# Patient Record
Sex: MALE | Race: AMERICAN INDIAN OR ALASKA NATIVE | NOT HISPANIC OR LATINO | URBAN - METROPOLITAN AREA
[De-identification: names, ages, dates, MRNs, and addresses within clinical notes are randomized per-mention and may not be internally consistent; named-entity substitution may affect disease eponyms.]

---

## 2017-02-15 ENCOUNTER — APPOINTMENT (OUTPATIENT)
Dept: TRANSPLANT | Facility: CLINIC | Age: 67
End: 2017-02-15
Attending: NURSE PRACTITIONER
Payer: MEDICARE

## 2017-02-15 ENCOUNTER — TELEPHONE (OUTPATIENT)
Dept: TRANSPLANT | Facility: CLINIC | Age: 67
End: 2017-02-15

## 2017-02-15 VITALS — WEIGHT: 211 LBS | HEIGHT: 72 IN | BODY MASS INDEX: 28.58 KG/M2

## 2017-02-15 NOTE — TELEPHONE ENCOUNTER
SOT LUNG INTAKE    February 15, 2017    Yovany Carr  [id number]  Body mass index is 28.62 kg/(m^2).  Referring Provider: Dr. Oli Sellers (Pulmonary, [facility] ), Dr. Justino Best (Surgery, [facility]), Charlette Carr NP ([facility])  Diagnosis: ILD, IPF, DM, CKD  Source/Facility: [place], [place]    History:  Smoking/nicotine use history: wife stated her  smoked cigarettes 1pk/ day, started at age 17, quit 17 yrs ago  Alcohol use history: stated used to drink beer on the weekends, at age 18 and quit age 23  Drug use history: none  Cancer history: entered in Epic  Cardiac history: entered in Epic    Family History and Social History were completed.    Primary Care  Colonoscopy: [place] ?unsure of date  Dental: has an upper plate and has an appt to have a lower plate placed  Hepatitis A/B: stated was given at the [facility] in 2013  Pneumovax: entered in Epic    Pulmonary Tests   PFTs: [facility], ?date  6 Minute Walk: [facility], ?date  Sleep Study: hx of SHASHANK 2013    Diagnostic Tests/Imaging  Heart cath: none  Stress Test: unsure if done, and if it was, would have been done at [facility] as stated  ECHO: none    Chest CT: unsure  DEXA: none  Upper GI: [facility]  5/3/16     Notes: Pt's wife gave the hx, Yovany was on the background and she was verifying information with him when not sure of diagnostic tests and dates.  All other med/ surg rec are pending.

## 2017-02-15 NOTE — TELEPHONE ENCOUNTER
Intake Progress Note    Organ: Lung    Referral Origin: fax    Referring Physician: Dr. Oli Sellers    Assigned Coordinator: Paty Steve    Reported Diagnosis: PF    Reported Medical History: [facility] in [place] and [facility] in [place], [place]     Records: Request through [employer]     Education Material: Mailed lung packet    Best time patient can be reached: Anytime    MyChart Status: Pending     Informed patient to call if packet hasn t been received within 10 business days  Insurance:   Medicare Part A     Health Service    Plan: [employer]  Group: [employer]

## 2017-02-15 NOTE — Clinical Note
Paty, it was [place] wife I s/w and she was consulting pt during the whole intake. The med/ surg hx is updated in [facility] and other med rec pending.

## 2017-02-15 NOTE — LETTER
February 15, 2017            Yovany Carr  [address]  [facility] [address]      Dear Dr. Sellers,    We are writing to inform you that Yovany Carr has completed the referral process with us to be evaluated for a lung transplant.    Their assigned Transplant Coordinator is Paty Steve. If you should have any questions or concerns, please don t hesitate to contact us.        Regards,           Solid Organ Transplant Intake   [facility], [address]  [address], [facility] [address]  Phone: [phone]  [place] [address]

## 2017-02-17 ENCOUNTER — MEDICAL CORRESPONDENCE (OUTPATIENT)
Dept: TRANSPLANT | Facility: CLINIC | Age: 67
End: 2017-02-17

## 2017-02-20 ENCOUNTER — MEDICAL CORRESPONDENCE (OUTPATIENT)
Dept: TRANSPLANT | Facility: CLINIC | Age: 67
End: 2017-02-20

## 2017-05-09 NOTE — TELEPHONE ENCOUNTER
"SOT LUNG INTAKE    May 9, 2017    Yovany Carr  [id number]  Referring Provider: Charlette Carr NP  Source/Facility: records/patient     Diagnosis: ILD, cirrhosis  67 year old    Height: 6' 0\"  Weight: 200 lbs  BMI:  27.1    Social History:    Social History     Social History     Marital status:      Spouse name: N/A     Number of children: N/A     Years of education: N/A     Occupational History     Not on file.     Social History Main Topics     Smoking status: Former Smoker     Packs/day: 1.00     Years: 32.00     Types: Cigarettes     Quit date: 1/1/2000     Smokeless tobacco: Not on file     Alcohol use Yes      Comment: occ on weekends from age 18- 23     Drug use: No     Sexual activity: Not on file     Other Topics Concern     Not on file     Social History Narrative       Second-hand Smoke exposure:      Family History:    Family History   Problem Relation Age of Onset     Uterine Cancer Mother      Colon Cancer Mother      Breast Cancer Father      Breast Cancer Sister      Uterine Cancer Sister        Past Medical History  Pulmonary Manifestations date: patient first aware after first of year   Details: Dr. Sellers prescribed pirfenidone, not tolerated due to abdominal discomfort and diarrhea (c-diff at the time)    Diabetes: yes, on insulin  Coronary Artery Disease: none known  Hypertension: no   Previous transfusion(s):       Other Past Medical History:      Past Medical History:   Diagnosis Date     CHF (congestive heart failure) (H)      Cirrhosis (H) 06/13/2013     DM (diabetes mellitus screen) 01/11/2013     Esophageal varices (H) 09/09/2013     GERD (gastroesophageal reflux disease)      IPF (idiopathic pulmonary fibrosis) (H) 05/30/2013     Mediastinal adenopathy 08/17/2012     NAFLD (nonalcoholic fatty liver disease)        Surgical History   Lung Biopsy: no  Pneumothorax:    Chest Surgery:      Past Surgical History:   Procedure Laterality Date     ARTHROSCOPY KNEE Left 1998     CYSTOSCOPY "  10/22/2013    with stent placement     OTHER SURGICAL HISTORY      Laminectomy      OTHER SURGICAL HISTORY      Extracorpeal shock wave lithotripsy     OTHER SURGICAL HISTORY  2012    Mediastinoscopy       Mental Health History  Depression: not discussed  Anxiety:    Other:      Medications  No current outpatient prescriptions on file.     No current facility-administered medications for this visit.      Blood thinner hx:    Prednisone hx:   Antibiotic hx: on xifaxin for prophylaxis for hepatic encephalopathy  Narcotic hx:    Lactulose: was on for about a year, off for 2 months due to diarrhea    Allergies  Review of patient's allergies indicates not on file.      Pulmonary Tests and Status  PFT's  Date: 17:  FVC  2.98, 62%   FEV1  2.27, 63%  DLCO 9.7, 39%  T.43, 61%   RV 1.35, 53%        6 Minute Walk: not found    Oxygen use (concern about compliance with use per NP note 2017)   At rest: 3 1/2 -4 liters   Sleep: 4 liters   With Activity: 5 liters (machine only goes to 5 liters)   Date of O2 initiation:        Sleep Study: yes, [place] per notes, negative for SHASHANK per notes    BiPAP/CPAP: no    Pulmonary Rehab: enrolled, 2x/week   Date: 2017 Location:        Current activity:   Walking is OK, stairs are more difficult.  Still showers without oxygen.  Does not do any shopping  No yard work this year.         Hospitalizations in prior 12 months  Date:  11/10-15/16  Location:  [facility] Reason: worsening dyspnea, fluid retention in abdomen and legs, pancytopenia (WBC 3.3, hgb 8.5, plt 60), creat 1.33, ammonia 71, ascites  Date:  - Location: [facility]  Reason: dyspnea         Diagnostic Tests/Imaging  Heart cath: no  Stress Test: 13: Negative-normal-adequate dobutamine stress echo with no  evidence of ischemia.    ECHO: 16: Normal left ventricular systolic function.  The ejection fraction is visually estimated to be 60 %.  There are no left  ventricular regional wall motion abnormalities.  There is moderate mitral stenosis  (11/04/16: mild mitral regurg)  Normal right ventricular size. Normal right ventricular systolic function. Normal right ventricular wall thickness.    CXR: 11/23/16: FINDINGS:  Comparison with 11/14/2016. Again noted is some elevation of the right  hemidiaphragm. There are diffuse increase in markings throughout the lungs  as seen on the earlier study. I did not see evidence of a new focal  infiltrate.    Chest CT: 11/10/16: IMPRESSION:  Relatively unchanged appearance of the paraseptal emphysematous changes  with apparent region of what appears to be almost honeycombing in the  apical segment of the right lower lobe though unchanged dating back to  2013. There is new interstitial thickening and centrilobular thickening  with somewhat nodular air space densities in the lower lobes, right  greater than left. This is favored to represent an underlying  bronchiolitis which may be infections or inflammatory in etiology. An  element of interstitial edema is likely present. Trace left pleural  effusion.  Cirrhotic liver morphology, portal hypertension, splenomegaly and  portosystemic shunt vasculature which is incompletely assessed and better  detailed on CT abdomen dated 06/13/2016.  Perihepatic ascites is noted and new.        DEXA: not found    Endoscopy: 5/03/16: Grade 2-3 varices were found in the mid-esophagus, band  ligation x 4 was performed. Mucosal scars were noted in distal esophagus  from prior band ligation. 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 Mild hypertensive  portal gastropathy was found in the body of the stomach, cardia, and  fundus. Normal cardia and fundus. On retroflexed view, there was no  evidence of varices in the stomach. Normal 1st portion of the duodenum,  2nd portion of the duodenum, and 3rd portion of the duodenum.    Paracentesis: 11/11/16:  IMPRESSION: Ultrasound-guided paracentesis yielding 1750 mL of serous,  yellow fluid.     Upper GI: 8/20/14: gastric antrum path: reactive/chemical gastropathy    Mediastinoscopy: 8/28/12: biopsy for mediastinal adenopathy, also noted vocal cord paralysis (path not attached)    LE venous dopplers: 11/11/16  No LE DVT (elevated D-dimer)    Primary Care  Health Maintenance   Topic Date Due     ADVANCE DIRECTIVE PLANNING Q5 YRS (NO INBASKET)  02/23/1970     HEPATITIS C SCREENING  02/23/1970     LIPID SCREEN Q5 YR MALE (SYSTEM ASSIGNED)  02/23/1987     COLON CANCER SCREEN (SYSTEM ASSIGNED)  02/23/2002     FALL RISK ASSESSMENT  02/23/2017     PNEUMOCOCCAL (1 of 2 - PCV13) 02/23/2017     AORTIC ANEURYSM SCREENING (SYSTEM ASSIGNED)  02/23/2017     INFLUENZA VACCINE (SYSTEM ASSIGNED)  09/01/2017     TETANUS IMMUNIZATION (SYSTEM ASSIGNED)  02/24/2019     PSA: normal in 2013  Colonoscopy: intermittent rectal bleeding 11/2016--advised to flu with PCP,   11/4/15: colonoscopy One 6 mm polyp at 50 cm proximal to the anus. Resected and retrieved.  - One 4 mm polyp at 40 cm proximal to the anus. Resected and retrieved.  - The examination was otherwise normal.    Labs  See Care Everywhere       Financial Concerns: home PT/OT in November 2016 post hospitalization        Notes: discussed with patient that per discussion with lung transplant team members ([employer] meeting), that due to concerns of history of cirrhosis, esophageal varices, etc, lung transplant is not something that can be offered at this time.  Offered clinic visit for ILD assessment/second opinion.      Plan: clinic visit with full PFTs and 6mw.    Concerns: cirrhosis, CKD

## 2017-05-18 DIAGNOSIS — J84.9 ILD (INTERSTITIAL LUNG DISEASE) (H): Primary | ICD-10-CM

## 2017-06-22 ENCOUNTER — OFFICE VISIT (OUTPATIENT)
Dept: PULMONOLOGY | Facility: CLINIC | Age: 67
End: 2017-06-22
Attending: INTERNAL MEDICINE
Payer: MEDICARE

## 2017-06-22 ENCOUNTER — APPOINTMENT (OUTPATIENT)
Dept: ULTRASOUND IMAGING | Facility: CLINIC | Age: 67
End: 2017-06-22
Attending: EMERGENCY MEDICINE
Payer: MEDICARE

## 2017-06-22 ENCOUNTER — HOSPITAL ENCOUNTER (EMERGENCY)
Facility: CLINIC | Age: 67
Discharge: HOME | End: 2017-06-22
Attending: EMERGENCY MEDICINE | Admitting: EMERGENCY MEDICINE
Payer: MEDICARE

## 2017-06-22 ENCOUNTER — APPOINTMENT (OUTPATIENT)
Dept: GENERAL RADIOLOGY | Facility: CLINIC | Age: 67
End: 2017-06-22
Attending: EMERGENCY MEDICINE
Payer: MEDICARE

## 2017-06-22 VITALS
SYSTOLIC BLOOD PRESSURE: 149 MMHG | OXYGEN SATURATION: 90 % | RESPIRATION RATE: 20 BRPM | DIASTOLIC BLOOD PRESSURE: 68 MMHG | HEART RATE: 80 BPM

## 2017-06-22 VITALS
OXYGEN SATURATION: 94 % | TEMPERATURE: 98 F | DIASTOLIC BLOOD PRESSURE: 58 MMHG | HEART RATE: 65 BPM | WEIGHT: 205.6 LBS | SYSTOLIC BLOOD PRESSURE: 150 MMHG | RESPIRATION RATE: 16 BRPM | HEIGHT: 72 IN | BODY MASS INDEX: 27.85 KG/M2

## 2017-06-22 DIAGNOSIS — J84.9 ILD (INTERSTITIAL LUNG DISEASE) (H): Primary | ICD-10-CM

## 2017-06-22 DIAGNOSIS — J84.9 ILD (INTERSTITIAL LUNG DISEASE) (H): ICD-10-CM

## 2017-06-22 LAB
ALBUMIN SERPL-MCNC: 2.4 G/DL (ref 3.4–5)
ALP SERPL-CCNC: 213 U/L (ref 40–150)
ALT SERPL W P-5'-P-CCNC: 43 U/L (ref 0–70)
ANION GAP SERPL CALCULATED.3IONS-SCNC: 5 MMOL/L (ref 3–14)
AST SERPL W P-5'-P-CCNC: 51 U/L (ref 0–45)
BASOPHILS # BLD AUTO: 0 10E9/L (ref 0–0.2)
BASOPHILS NFR BLD AUTO: 0.6 %
BILIRUB DIRECT SERPL-MCNC: 0.4 MG/DL (ref 0–0.2)
BILIRUB SERPL-MCNC: 1.4 MG/DL (ref 0.2–1.3)
BUN SERPL-MCNC: 20 MG/DL (ref 7–30)
CALCIUM SERPL-MCNC: 8.7 MG/DL (ref 8.5–10.1)
CHLORIDE SERPL-SCNC: 110 MMOL/L (ref 94–109)
CO2 BLDCOV-SCNC: 25 MMOL/L (ref 21–28)
CO2 SERPL-SCNC: 27 MMOL/L (ref 20–32)
CREAT SERPL-MCNC: 1.15 MG/DL (ref 0.66–1.25)
DIFFERENTIAL METHOD BLD: ABNORMAL
EOSINOPHIL # BLD AUTO: 0.1 10E9/L (ref 0–0.7)
EOSINOPHIL NFR BLD AUTO: 3.2 %
ERYTHROCYTE [DISTWIDTH] IN BLOOD BY AUTOMATED COUNT: 16.2 % (ref 10–15)
GFR SERPL CREATININE-BSD FRML MDRD: 64 ML/MIN/1.7M2
GLUCOSE SERPL-MCNC: 139 MG/DL (ref 70–99)
HCT VFR BLD AUTO: 40.5 % (ref 40–53)
HGB BLD-MCNC: 13.1 G/DL (ref 13.3–17.7)
IMM GRANULOCYTES # BLD: 0 10E9/L (ref 0–0.4)
IMM GRANULOCYTES NFR BLD: 0.3 %
INTERPRETATION ECG - MUSE: NORMAL
LACTATE BLD-SCNC: 1.3 MMOL/L (ref 0.7–2.1)
LYMPHOCYTES # BLD AUTO: 0.7 10E9/L (ref 0.8–5.3)
LYMPHOCYTES NFR BLD AUTO: 22.5 %
MCH RBC QN AUTO: 30.5 PG (ref 26.5–33)
MCHC RBC AUTO-ENTMCNC: 32.3 G/DL (ref 31.5–36.5)
MCV RBC AUTO: 94 FL (ref 78–100)
MONOCYTES # BLD AUTO: 0.3 10E9/L (ref 0–1.3)
MONOCYTES NFR BLD AUTO: 8.3 %
NEUTROPHILS # BLD AUTO: 2.1 10E9/L (ref 1.6–8.3)
NEUTROPHILS NFR BLD AUTO: 65.1 %
NRBC # BLD AUTO: 0 10*3/UL
NRBC BLD AUTO-RTO: 0 /100
NT-PROBNP SERPL-MCNC: 208 PG/ML (ref 0–900)
PCO2 BLDV: 41 MM HG (ref 40–50)
PH BLDV: 7.4 PH (ref 7.32–7.43)
PLATELET # BLD AUTO: 58 10E9/L (ref 150–450)
PO2 BLDV: 46 MM HG (ref 25–47)
POTASSIUM SERPL-SCNC: 4 MMOL/L (ref 3.4–5.3)
PROT SERPL-MCNC: 6.5 G/DL (ref 6.8–8.8)
RBC # BLD AUTO: 4.3 10E12/L (ref 4.4–5.9)
SAO2 % BLDV FROM PO2: 81 %
SODIUM SERPL-SCNC: 142 MMOL/L (ref 133–144)
TROPONIN I SERPL-MCNC: NORMAL UG/L (ref 0–0.04)
WBC # BLD AUTO: 3.2 10E9/L (ref 4–11)

## 2017-06-22 PROCEDURE — 93005 ELECTROCARDIOGRAM TRACING: CPT | Performed by: EMERGENCY MEDICINE

## 2017-06-22 PROCEDURE — 93970 EXTREMITY STUDY: CPT | Mod: XS

## 2017-06-22 PROCEDURE — 85025 COMPLETE CBC W/AUTO DIFF WBC: CPT | Performed by: EMERGENCY MEDICINE

## 2017-06-22 PROCEDURE — 99285 EMERGENCY DEPT VISIT HI MDM: CPT | Mod: 25 | Performed by: EMERGENCY MEDICINE

## 2017-06-22 PROCEDURE — 83880 ASSAY OF NATRIURETIC PEPTIDE: CPT | Performed by: EMERGENCY MEDICINE

## 2017-06-22 PROCEDURE — 71020 XR CHEST 2 VW: CPT

## 2017-06-22 PROCEDURE — 83605 ASSAY OF LACTIC ACID: CPT

## 2017-06-22 PROCEDURE — 82803 BLOOD GASES ANY COMBINATION: CPT

## 2017-06-22 PROCEDURE — 80048 BASIC METABOLIC PNL TOTAL CA: CPT | Performed by: EMERGENCY MEDICINE

## 2017-06-22 PROCEDURE — 93010 ELECTROCARDIOGRAM REPORT: CPT | Mod: Z6 | Performed by: EMERGENCY MEDICINE

## 2017-06-22 PROCEDURE — 84484 ASSAY OF TROPONIN QUANT: CPT | Performed by: EMERGENCY MEDICINE

## 2017-06-22 PROCEDURE — 80076 HEPATIC FUNCTION PANEL: CPT | Performed by: EMERGENCY MEDICINE

## 2017-06-22 PROCEDURE — 93970 EXTREMITY STUDY: CPT

## 2017-06-22 RX ORDER — LACTULOSE 10 G/15ML
15 SOLUTION ORAL
COMMUNITY

## 2017-06-22 RX ORDER — SYRINGE-NEEDLE,INSULIN,0.5 ML 27GX1/2"
SYRINGE, EMPTY DISPOSABLE MISCELLANEOUS
COMMUNITY
Start: 2013-09-03

## 2017-06-22 RX ORDER — HYDROCODONE BITARTRATE AND ACETAMINOPHEN 10; 325 MG/1; MG/1
1 TABLET ORAL
COMMUNITY

## 2017-06-22 RX ORDER — DOXAZOSIN 2 MG/1
2 TABLET ORAL
COMMUNITY

## 2017-06-22 RX ORDER — PIRFENIDONE 267 MG/1
1 CAPSULE ORAL
COMMUNITY
Start: 2017-03-09 | End: 2017-11-16

## 2017-06-22 RX ORDER — FERROUS SULFATE 7.5 MG/0.5
45 SYRINGE (EA) ORAL
COMMUNITY

## 2017-06-22 RX ORDER — MAGNESIUM OXIDE 400 MG/1
400 TABLET ORAL
COMMUNITY

## 2017-06-22 RX ORDER — IBUPROFEN 800 MG
400 TABLET ORAL
COMMUNITY

## 2017-06-22 RX ORDER — MULTIVITAMIN
TABLET ORAL
COMMUNITY

## 2017-06-22 RX ORDER — MULTIVIT-MIN/IRON FUM/FOLIC AC 19 MG-400
1 TABLET ORAL
COMMUNITY
End: 2017-11-16

## 2017-06-22 RX ORDER — OXYCODONE HYDROCHLORIDE 5 MG/1
5 CAPSULE ORAL
COMMUNITY

## 2017-06-22 RX ORDER — ESCITALOPRAM OXALATE 5 MG/1
40 TABLET ORAL
COMMUNITY

## 2017-06-22 RX ORDER — LOSARTAN POTASSIUM 25 MG/1
25 TABLET ORAL
COMMUNITY
Start: 2016-11-07

## 2017-06-22 RX ORDER — GABAPENTIN 300 MG/1
600 CAPSULE ORAL
COMMUNITY
End: 2017-11-16

## 2017-06-22 RX ORDER — ALBUTEROL SULFATE 90 UG/1
2 AEROSOL, METERED RESPIRATORY (INHALATION)
COMMUNITY

## 2017-06-22 RX ORDER — FERROUS GLUCONATE 324(38)MG
324 TABLET ORAL
COMMUNITY

## 2017-06-22 RX ORDER — ESCITALOPRAM OXALATE 20 MG/1
20 TABLET ORAL
COMMUNITY

## 2017-06-22 RX ORDER — FUROSEMIDE 20 MG
20 TABLET ORAL
COMMUNITY

## 2017-06-22 RX ORDER — ASCORBIC ACID 500 MG
500 TABLET ORAL
COMMUNITY

## 2017-06-22 ASSESSMENT — ENCOUNTER SYMPTOMS
NAUSEA: 0
SORE THROAT: 0
HEADACHES: 0
VOMITING: 0
COLOR CHANGE: 0
DIARRHEA: 0
FEVER: 0
PALPITATIONS: 0
WHEEZING: 0
ABDOMINAL PAIN: 0
COUGH: 1
CHILLS: 0
WEAKNESS: 1
CONFUSION: 0
SHORTNESS OF BREATH: 0
DIFFICULTY URINATING: 0
FATIGUE: 1

## 2017-06-22 ASSESSMENT — PAIN SCALES - GENERAL: PAINLEVEL: MODERATE PAIN (5)

## 2017-06-22 NOTE — ED NOTES
Bed: IN01  Expected date: 6/22/17  Expected time:   Means of arrival:   Comments:  Yovany Carr, from pulm clinic.  Acutely needing more oxygen.  Hx of ILD and cirrhosis.

## 2017-06-22 NOTE — MR AVS SNAPSHOT
"              After Visit Summary   2017    Yovany Carr    MRN: [id number]           Patient Information     Date Of Birth          1950        Visit Information        Provider Department      2017 1:10 PM Jeane Greer MD [facility]        Care Instructions    Patient is instructed to call if there is any changes in symptoms.          Follow-ups after your visit        Follow-up notes from your care team     Return in about 3 months (around 2017).      Who to contact     If you have questions or need follow up information about today's clinic visit or your schedule please contact [facility] L[facility] directly at [phone].  Normal or non-critical lab and imaging results will be communicated to you by MyChart, letter or phone within 4 business days after the clinic has received the results. If you do not hear from us within 7 days, please contact the clinic through MyChart or phone. If you have a critical or abnormal lab result, we will notify you by phone as soon as possible.  Submit refill requests through [employer] or call your pharmacy and they will forward the refill request to us. Please allow 3 business days for your refill to be completed.          Additional Information About Your Visit        MyChart Information     [employer] lets you send messages to your doctor, view your test results, renew your prescriptions, schedule appointments and more. To sign up, go to [email or web address] . Click on \"Log in\" on the left side of the screen, which will take you to the Welcome page. Then click on \"Sign up Now\" on the right side of the page.     You will be asked to enter the access code listed below, as well as some personal information. Please follow the directions to create your username and password.     Your access code is: [email or web address]  Expires: 2017  4:10 PM     Your access code will  in 90 days. If you need help or a " new code, please call your [facility] clinic or [phone].        Care EveryWhere ID     This is your Care EveryWhere ID. This could be used by other organizations to access your [facility] medical records  [id number]        Your Vitals Were     Pulse Respirations Pulse Oximetry             80 20 90%          Blood Pressure from Last 3 Encounters:   06/22/17 152/66   06/22/17 149/68    Weight from Last 3 Encounters:   06/22/17 93.3 kg (205 lb 9.6 oz)   02/15/17 95.7 kg (211 lb)              Today, you had the following     No orders found for display       Primary Care Provider Office Phone # Fax #    Charlette Carr, KEMAL [phone]       [place] [address]        Equal Access to Services     PINKY TSE : Hadii eusebio arcos Soyesica, waaxda luqadaha, qaybta kaalmada adeegyada, sabina piedra . [facility] [phone].    ATENCIÓN: Si habla español, tiene a aponte disposición servicios gratuitos de asistencia lingüística. Cait al [phone].    We comply with applicable federal civil rights laws and [place] laws. We do not discriminate on the basis of race, color, national origin, age, disability sex, sexual orientation or gender identity.            Thank you!     Thank you for choosing [facility]  for your care. Our goal is always to provide you with excellent care. Hearing back from our patients is one way we can continue to improve our services. Please take a few minutes to complete the written survey that you may receive in the mail after your visit with us. Thank you!             Your Updated Medication List - Protect others around you: Learn how to safely use, store and throw away your medicines at www.disposemymeds.org.          This list is accurate as of: 6/22/17  4:10 PM.  Always use your most recent med list.                   Brand Name Dispense Instructions for use Diagnosis     "albuterol 108 (90 BASE) MCG/ACT Inhaler    PROAIR HFA/PROVENTIL HFA/VENTOLIN HFA     Inhale 2 puffs into the lungs        ascorbic acid 500 MG Tabs      Take 500 mg by mouth        BD insulin syringe ultrafine 30G X 1/2\" 1 ML   Generic drug:  insulin syringe-needle U-100           calcium citrate-vitamin D 315-250 MG-UNIT Tabs per tablet    CITRACAL     Take 1 tablet by mouth        DAILY DANIELITO Tabs           doxazosin 2 MG tablet    CARDURA     Take 2 mg by mouth        * escitalopram 5 MG tablet    LEXAPRO     Take 40 mg by mouth        * escitalopram 20 MG tablet    LEXAPRO     Take 20 mg by mouth        eucerin cream           ferrous gluconate 324 (38 FE) MG tablet    FERGON     Take 324 mg by mouth        ferrous sulfate 75 (15 FE) MG/ML oral drops    KASEY-IN-SOL     Take 45 mg by mouth        furosemide 20 MG tablet    LASIX     Take 20 mg by mouth        gabapentin 300 MG capsule    NEURONTIN     Take 600 mg by mouth        HYDROcodone-acetaminophen  MG per tablet    NORCO     Take 1 tablet by mouth        * insulin aspart 100 UNIT/ML injection    NovoLOG PEN     Inject 15 Units Subcutaneous        * insulin aspart 100 UNITS/ML injection    NovoLOG     Inject 8 Units Subcutaneous        * insulin isophane & regular susp    HumuLIN MIX 70/30 PEN , NovoLIN MIX 70/30 PEN     Inject 30 Units Subcutaneous        * insulin NPH-insulin regular injection    HumuLIN MIX 70/30/NovoLIN MIX 70/30     Inject 18 Units Subcutaneous        LACTOBACILLUS ACID-PECTIN PO           lactulose 10 GM/15ML solution    CHRONULAC     Take 15 mLs by mouth        losartan 25 MG tablet    COZAAR     Take 25 mg by mouth        magnesium oxide 400 MG tablet    MAG-OX     Take 400 mg by mouth        mometasone-formoterol 100-5 MCG/ACT oral inhaler    DULERA     Inhale 2 puffs into the lungs        NOVOFINE 30G X 8 MM   Generic drug:  insulin pen needle           omeprazole 20 MG CR capsule    priLOSEC     Take 20 mg by mouth        " oxyCODONE 5 MG capsule    OXY-IR     Take 5 mg by mouth        pirfenidone 267 MG capsule    ESBRIET     Take 1 capsule by mouth        rifaximin 550 MG Tabs tablet    XIFAXAN     Take 550 mg by mouth        THERA-TABS M Tabs      Take 1 tablet by mouth        VITAMIN A & D PO           Vitamin D3 400 UNITS Caps      Take 400 Units by mouth        * Notice:  This list has 6 medication(s) that are the same as other medications prescribed for you. Read the directions carefully, and ask your doctor or other care provider to review them with you.

## 2017-06-22 NOTE — ED NOTES
Patient has oxygen saturations at % on 4 L via NC but does drop to 94% while sleeping. Patient normally wears 3 L oxygen via NC at all times at home.

## 2017-06-22 NOTE — NURSING NOTE
Chief Complaint   Patient presents with     Transplant Referral     Patient is being seen for pre lung tx referral.        Yolette Trejo  CMA at 1:24 PM on 6/22/2017

## 2017-06-22 NOTE — ED AVS SNAPSHOT
[facility], [place], Emergency Department    [address] [place] [address]    Phone:  [phone]                                       Yovany Carr   MRN: [id number]    Department:  [facility], Emergency Department   Date of Visit:  6/22/2017           After Visit Summary Signature Page     I have received my discharge instructions, and my questions have been answered. I have discussed any challenges I see with this plan with the nurse or doctor.    ..........................................................................................................................................  Patient/Patient Representative Signature      ..........................................................................................................................................  Patient Representative Print Name and Relationship to Patient    ..................................................               ................................................  Date                                            Time    ..........................................................................................................................................  Reviewed by Signature/Title    ...................................................              ..............................................  Date                                                            Time

## 2017-06-22 NOTE — LETTER
6/22/2017       RE: Yovany Carr  [address]  [place] [address]     Dear Colleague,    Thank you for referring your patient, Yovany Carr, to the [facility] at [facility]. Please see a copy of my visit note below.            [facility]   Pulmonary Clinic Follow Up Note  June 22, 2017      Yovany Carr MRN# [id number]   Age: 67 year old YOB: 1950     Date of Consultation: June 22, 2017    Primary care provider: Charlette Carr     History taken from; Patient      Yovany Carr is a 67 year old male seen in the Pulmonary Clinic  for pulmonary evaluation.  Chief Complaint   Patient presents with     Transplant Referral     Patient is being seen for pre lung tx referral.   .          Pulmonary Problem List / Reason for follow up:   1. ILD           Assessment and Plan:          ASSESSMENT AND PLAN:  The patient is a 67-year-old gentleman with a history of interstitial lung disease and liver cirrhosis, coming for the pulmonary evaluation.  The patient is found to be profoundly more hypoxic than normal.  The patient will be referred to the Emergency Department.  The patient came after driving for 4-5 hours yesterday, so we will need to rule out pulmonary emboli for the patient.  The patient will need a workup for interstitial lung disease.  The patient will also need an echocardiogram.  We will need to start the patient on a prednisone and cellcept.We will follow patient in 3 months.We explained the plan to the patient including the risks and benefits.  The patient expressed understanding and agreed with the plan.      Thank you very much for the opportunity to participate in the care of this very pleasant patient.       Return visit in 3 months      Jeane Greer M.D.         History of Present Illness / Interval History:   HISTORY OF PRESENT ILLNESS:  The patient is a 67-year-old gentleman with a history  "of interstitial lung disease and liver failure, coming for the pulmonary evaluation.  The patient was at home on 3-4 liters of oxygen and found to require 10 liters of oxygen with Oxymizer.  The patient has orthodeoxia so patient is desaturating in sitting position and has probably a component to the hepatopulmonary syndrome with AVMs and the lower pulmonary lobes.  The patient is overall doing worse.  The patient did not have a significant workup for interstitial lung disease and the patient's last echo did not reveal any pulmonary hypertension.  Currently at home the patient is mostly sedentary.      PAST MEDICAL HISTORY:  Positive for liver disease, kidney insufficiency, diabetes, mitral stenosis and previous fungal infection.      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  The patient has a 35-pack-year history of smoking and has not smoked for 20 years.  The patient denies any alcohol abuse.  The patient does not have pets.  The patient does not have a hot bath.        FAMILY HISTORY:  There is no lung disease in the family.                    Pulmonary Data:       Exposure history: Asbestos;  No , TB;  No , Radiation;   No          Medications:     Current Outpatient Prescriptions   Medication Sig     insulin syringe-needle U-100 (BD INSULIN SYRINGE ULTRAFINE) 30G X 1/2\" 1 ML      calcium citrate-vitamin D (CITRACAL) 315-250 MG-UNIT TABS per tablet Take 1 tablet by mouth     doxazosin (CARDURA) 2 MG tablet Take 2 mg by mouth     escitalopram (LEXAPRO) 5 MG tablet Take 40 mg by mouth     Multiple Vitamins-Minerals (THERA-TABS M) TABS Take 1 tablet by mouth     ferrous gluconate (FERGON) 324 (38 FE) MG tablet Take 324 mg by mouth     furosemide (LASIX) 20 MG tablet Take 20 mg by mouth     HYDROcodone-acetaminophen (NORCO)  MG per tablet Take 1 tablet by mouth     insulin aspart (NOVOLOG) 100 UNITS/ML injection Inject 8 Units " Subcutaneous     insulin NPH-insulin regular (HUMULIN MIX 70/30/NOVOLIN MIX 70/30) injection Inject 18 Units Subcutaneous     magnesium oxide (MAG-OX) 400 MG tablet Take 400 mg by mouth     insulin pen needle (NOVOFINE) 30G X 8 MM      omeprazole (PRILOSEC) 20 MG CR capsule Take 20 mg by mouth     pirfenidone (ESBRIET) 267 MG capsule Take 1 capsule by mouth     rifaximin (XIFAXAN) 550 MG TABS tablet Take 550 mg by mouth     albuterol (PROAIR HFA/PROVENTIL HFA/VENTOLIN HFA) 108 (90 BASE) MCG/ACT Inhaler Inhale 2 puffs into the lungs     ascorbic acid 500 MG TABS Take 500 mg by mouth     Cholecalciferol (VITAMIN D3) 400 UNITS CAPS Take 400 Units by mouth     escitalopram (LEXAPRO) 20 MG tablet Take 20 mg by mouth     ferrous sulfate (KASEY-IN-SOL) 75 (15 FE) MG/ML oral drops Take 45 mg by mouth     gabapentin (NEURONTIN) 300 MG capsule Take 600 mg by mouth     insulin aspart (NOVOLOG PEN) 100 UNIT/ML injection Inject 15 Units Subcutaneous     insulin isophane & regular (HUMULIN MIX 70/30 PEN , NOVOLIN MIX 70/30 PEN) susp Inject 30 Units Subcutaneous     LACTOBACILLUS ACID-PECTIN PO      lactulose (CHRONULAC) 10 GM/15ML solution Take 15 mLs by mouth     losartan (COZAAR) 25 MG tablet Take 25 mg by mouth     mometasone-formoterol (DULERA) 100-5 MCG/ACT oral inhaler Inhale 2 puffs into the lungs     Multiple Vitamin (DAILY DANIELITO) TABS      oxyCODONE (OXY-IR) 5 MG capsule Take 5 mg by mouth     Skin Protectants, Misc. (EUCERIN) cream      Vitamins A & D (VITAMIN A & D PO)      No current facility-administered medications for this visit.              Past Medical History:     Past Medical History:   Diagnosis Date     CHF (congestive heart failure) (H)      Cirrhosis (H) 06/13/2013     DM (diabetes mellitus screen) 01/11/2013     Esophageal varices (H) 09/09/2013     GERD (gastroesophageal reflux disease)      IPF (idiopathic pulmonary fibrosis) (H) 05/30/2013     Mediastinal adenopathy 08/17/2012     NAFLD (nonalcoholic  fatty liver disease)              Past Surgical History:     Past Surgical History:   Procedure Laterality Date     ARTHROSCOPY KNEE Left 1998     CHOLECYSTECTOMY, LAPOROSCOPIC       CYSTOSCOPY  10/22/2013    with stent placement     OTHER SURGICAL HISTORY  11/21/2002    diskectomy, spinal fusion     OTHER SURGICAL HISTORY      Extracorpeal shock wave lithotripsy     OTHER SURGICAL HISTORY  08/28/2012    Mediastinoscopy             Social History:     Social History     Social History     Marital status:      Spouse name: N/A     Number of children: N/A     Years of education: N/A     Occupational History     Not on file.     Social History Main Topics     Smoking status: Former Smoker     Packs/day: 1.00     Years: 32.00     Types: Cigarettes     Quit date: 1/1/2000     Smokeless tobacco: Not on file     Alcohol use Yes      Comment: occ on weekends from age 18- 23     Drug use: No     Sexual activity: Not on file     Other Topics Concern     Not on file     Social History Narrative             Family History:     Family History   Problem Relation Age of Onset     Uterine Cancer Mother      Colon Cancer Mother      Breast Cancer Father      Breast Cancer Sister      Uterine Cancer Sister              Immunization:     Immunization History   Administered Date(s) Administered     Hepatitis A Not Indicated - By Hx 09/09/2013     Pneumococcal 23 valent 09/16/2003     TDAP Vaccine (Adacel) 02/24/2009     Zoster vaccine, live 04/25/2012              Review of Systems:   I have done 10 points of review systems and pertinent findings are as above, otherwise negative.             Physical Examination:   General: Alert, oriented, not in distress  B/P: 149/68, T: Data Unavailable, P: 80, R: 20  HEENT: neck supple, symmetrical, no lymph node enlargement, thyromegaly, bruit, JVD, pupils are isocoric and equally responsive to the light.   Lungs: both hemithoraces are symmetrical, normal to palpation, no dullness to  percussion, auscultation of lungs was not performed.  CVS: Normal S1 and S2, no additional heart sounds, murmur, rub, normal peripheral pulses  Abdomen: Bowel sounds present, soft, non tender, non distended, no organomegaly, ascitis, mass  Extremities/musculoskeletal: no peripheral edema, deformity, cyanosis, clubbing  Neurology: alert, orientedx3, no motor/sensorial deficits, cranial nerves grossly normal  Skin: no rash  Psychiatry: Mood and affect are appropriate             DATA:         PFT   PFT Latest Ref Rng & Units 6/22/2017   FVC L 2.87   FEV1 L 2.44   FVC% % 61   FEV1% % 68         Thank you for allowing me participate in the care of Yovany Carr.  =  Associate Professor of Medicine  Pulmonary, Allergy, Critical Care and Sleep

## 2017-06-22 NOTE — PROGRESS NOTES
[facility]   Pulmonary Clinic Follow Up Note  June 22, 2017      Yovany Carr MRN# [id number]   Age: 67 year old YOB: 1950     Date of Consultation: June 22, 2017    Primary care provider: Charlette Carr     History taken from; Patient      Yovany Carr is a 67 year old male seen in the Pulmonary Clinic  for pulmonary evaluation.  Chief Complaint   Patient presents with     Transplant Referral     Patient is being seen for pre lung tx referral.   .          Pulmonary Problem List / Reason for follow up:   1. ILD           Assessment and Plan:          ASSESSMENT AND PLAN:  The patient is a 67-year-old gentleman with a history of interstitial lung disease and liver cirrhosis, coming for the pulmonary evaluation.  The patient is found to be profoundly more hypoxic than normal.  The patient will be referred to the Emergency Department.  The patient came after driving for 4-5 hours yesterday, so we will need to rule out pulmonary emboli for the patient.  The patient will need a workup for interstitial lung disease.  The patient will also need an echocardiogram.  We will need to start the patient on a prednisone and cellcept.We will follow patient in 3 months.We explained the plan to the patient including the risks and benefits.  The patient expressed understanding and agreed with the plan.      Thank you very much for the opportunity to participate in the care of this very pleasant patient.       Return visit in 3 months      Jeane Greer M.D.         History of Present Illness / Interval History:   HISTORY OF PRESENT ILLNESS:  The patient is a 67-year-old gentleman with a history of interstitial lung disease and liver failure, coming for the pulmonary evaluation.  The patient was at home on 3-4 liters of oxygen and found to require 10 liters of oxygen with Oxymizer.  The patient has orthodeoxia so patient is desaturating in sitting position and has probably a  "component to the hepatopulmonary syndrome with AVMs and the lower pulmonary lobes.  The patient is overall doing worse.  The patient did not have a significant workup for interstitial lung disease and the patient's last echo did not reveal any pulmonary hypertension.  Currently at home the patient is mostly sedentary.      PAST MEDICAL HISTORY:  Positive for liver disease, kidney insufficiency, diabetes, mitral stenosis and previous fungal infection.      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  The patient has a 35-pack-year history of smoking and has not smoked for 20 years.  The patient denies any alcohol abuse.  The patient does not have pets.  The patient does not have a hot bath.        FAMILY HISTORY:  There is no lung disease in the family.                    Pulmonary Data:       Exposure history: Asbestos;  No , TB;  No , Radiation;   No          Medications:     Current Outpatient Prescriptions   Medication Sig     insulin syringe-needle U-100 (BD INSULIN SYRINGE ULTRAFINE) 30G X 1/2\" 1 ML      calcium citrate-vitamin D (CITRACAL) 315-250 MG-UNIT TABS per tablet Take 1 tablet by mouth     doxazosin (CARDURA) 2 MG tablet Take 2 mg by mouth     escitalopram (LEXAPRO) 5 MG tablet Take 40 mg by mouth     Multiple Vitamins-Minerals (THERA-TABS M) TABS Take 1 tablet by mouth     ferrous gluconate (FERGON) 324 (38 FE) MG tablet Take 324 mg by mouth     furosemide (LASIX) 20 MG tablet Take 20 mg by mouth     HYDROcodone-acetaminophen (NORCO)  MG per tablet Take 1 tablet by mouth     insulin aspart (NOVOLOG) 100 UNITS/ML injection Inject 8 Units Subcutaneous     insulin NPH-insulin regular (HUMULIN MIX 70/30/NOVOLIN MIX 70/30) injection Inject 18 Units Subcutaneous     magnesium oxide (MAG-OX) 400 MG tablet Take 400 mg by mouth     insulin pen needle (NOVOFINE) 30G X 8 MM      omeprazole (PRILOSEC) 20 MG CR capsule Take 20 mg by " mouth     pirfenidone (ESBRIET) 267 MG capsule Take 1 capsule by mouth     rifaximin (XIFAXAN) 550 MG TABS tablet Take 550 mg by mouth     albuterol (PROAIR HFA/PROVENTIL HFA/VENTOLIN HFA) 108 (90 BASE) MCG/ACT Inhaler Inhale 2 puffs into the lungs     ascorbic acid 500 MG TABS Take 500 mg by mouth     Cholecalciferol (VITAMIN D3) 400 UNITS CAPS Take 400 Units by mouth     escitalopram (LEXAPRO) 20 MG tablet Take 20 mg by mouth     ferrous sulfate (KASEY-IN-SOL) 75 (15 FE) MG/ML oral drops Take 45 mg by mouth     gabapentin (NEURONTIN) 300 MG capsule Take 600 mg by mouth     insulin aspart (NOVOLOG PEN) 100 UNIT/ML injection Inject 15 Units Subcutaneous     insulin isophane & regular (HUMULIN MIX 70/30 PEN , NOVOLIN MIX 70/30 PEN) susp Inject 30 Units Subcutaneous     LACTOBACILLUS ACID-PECTIN PO      lactulose (CHRONULAC) 10 GM/15ML solution Take 15 mLs by mouth     losartan (COZAAR) 25 MG tablet Take 25 mg by mouth     mometasone-formoterol (DULERA) 100-5 MCG/ACT oral inhaler Inhale 2 puffs into the lungs     Multiple Vitamin (DAILY DANIELITO) TABS      oxyCODONE (OXY-IR) 5 MG capsule Take 5 mg by mouth     Skin Protectants, Misc. (EUCERIN) cream      Vitamins A & D (VITAMIN A & D PO)      No current facility-administered medications for this visit.              Past Medical History:     Past Medical History:   Diagnosis Date     CHF (congestive heart failure) (H)      Cirrhosis (H) 06/13/2013     DM (diabetes mellitus screen) 01/11/2013     Esophageal varices (H) 09/09/2013     GERD (gastroesophageal reflux disease)      IPF (idiopathic pulmonary fibrosis) (H) 05/30/2013     Mediastinal adenopathy 08/17/2012     NAFLD (nonalcoholic fatty liver disease)              Past Surgical History:     Past Surgical History:   Procedure Laterality Date     ARTHROSCOPY KNEE Left 1998     CHOLECYSTECTOMY, LAPOROSCOPIC       CYSTOSCOPY  10/22/2013    with stent placement     OTHER SURGICAL HISTORY  11/21/2002    diskectomy, spinal  fusion     OTHER SURGICAL HISTORY      Extracorpeal shock wave lithotripsy     OTHER SURGICAL HISTORY  08/28/2012    Mediastinoscopy             Social History:     Social History     Social History     Marital status:      Spouse name: N/A     Number of children: N/A     Years of education: N/A     Occupational History     Not on file.     Social History Main Topics     Smoking status: Former Smoker     Packs/day: 1.00     Years: 32.00     Types: Cigarettes     Quit date: 1/1/2000     Smokeless tobacco: Not on file     Alcohol use Yes      Comment: occ on weekends from age 18- 23     Drug use: No     Sexual activity: Not on file     Other Topics Concern     Not on file     Social History Narrative             Family History:     Family History   Problem Relation Age of Onset     Uterine Cancer Mother      Colon Cancer Mother      Breast Cancer Father      Breast Cancer Sister      Uterine Cancer Sister              Immunization:     Immunization History   Administered Date(s) Administered     Hepatitis A Not Indicated - By Hx 09/09/2013     Pneumococcal 23 valent 09/16/2003     TDAP Vaccine (Adacel) 02/24/2009     Zoster vaccine, live 04/25/2012              Review of Systems:   I have done 10 points of review systems and pertinent findings are as above, otherwise negative.             Physical Examination:   General: Alert, oriented, not in distress  B/P: 149/68, T: Data Unavailable, P: 80, R: 20  HEENT: neck supple, symmetrical, no lymph node enlargement, thyromegaly, bruit, JVD, pupils are isocoric and equally responsive to the light.   Lungs: both hemithoraces are symmetrical, normal to palpation, no dullness to percussion, auscultation of lungs was not performed.  CVS: Normal S1 and S2, no additional heart sounds, murmur, rub, normal peripheral pulses  Abdomen: Bowel sounds present, soft, non tender, non distended, no organomegaly, ascitis, mass  Extremities/musculoskeletal: no peripheral edema,  deformity, cyanosis, clubbing  Neurology: alert, orientedx3, no motor/sensorial deficits, cranial nerves grossly normal  Skin: no rash  Psychiatry: Mood and affect are appropriate             DATA:         PFT   PFT Latest Ref Rng & Units 6/22/2017   FVC L 2.87   FEV1 L 2.44   FVC% % 61   FEV1% % 68               Thank you for allowing me participate in the care of Yovany Carr.    Jeane Greer M.D.  Associate Professor of Medicine  Pulmonary, Allergy, Critical Care and Sleep

## 2017-06-22 NOTE — ED NOTES
Pt was sent from pulmonary clinic today for hypoxia on 3 liters NC. Pt's oxygen sats were 82 percent on 3 liters. Pt denies any SOB at that time. During triage pt alert and oriented x4, vitals stable, afebrile. Oxygen sats 94 percent on 6 liters NC. Pt came to clinic to be evaluated for a lung transplant.

## 2017-06-22 NOTE — ED AVS SNAPSHOT
[facility], Emergency Department    [address] ([place]) [address]    Phone:  [phone]                                       Yovany Carr   MRN: [id number]    Department:  [facility], Emergency Department   Date of Visit:  6/22/2017           Patient Information     Date Of Birth          1950        Your diagnoses for this visit were:     ILD (interstitial lung disease) (H)        You were seen by Shantelle Rodríguez MD.        Discharge Instructions       You have been seen in the ER for evaluation of low oxygen saturations.  All of your blood work looks normal (or normal for you - your liver function tests will always be abnormal due to your liver disease).  Your ultrasounds do not show any sign of blood clot.  You do not have any clinical sign of infection at this time.  We have discussed this with the pulmonary clinic and they are recommending that we discharge you to home, continue your regular home oxygen.  You should call the pulmonary clinic tomorrow to discuss what the next steps are for you.      Please call the Pulmonology Clinic at [phone] tomorrow.    Discharge References/Attachments     LUNG DISEASE, INTERSTITIAL  (ENGLISH)      24 Hour Appointment Hotline       To make an appointment at any [facility], call [phone] ([phone]). If you don't have a family doctor or clinic, we will help you find one. [facility] clinics are conveniently located to serve the needs of you and your family.             Review of your medicines      Our records show that you are taking the medicines listed below. If these are incorrect, please call your family doctor or clinic.        Dose / Directions Last dose taken    albuterol 108 (90 BASE) MCG/ACT Inhaler   Commonly known as:  PROAIR HFA/PROVENTIL HFA/VENTOLIN HFA   Dose:  2 puff        Inhale 2 puffs into the lungs   Refills:  0        ascorbic acid 500 MG Tabs   Dose:  500 mg        Take 500 mg by mouth   Refills:  0      "   BD insulin syringe ultrafine 30G X 1/2\" 1 ML   Generic drug:  insulin syringe-needle U-100        Refills:  0        calcium citrate-vitamin D 315-250 MG-UNIT Tabs per tablet   Commonly known as:  CITRACAL   Dose:  1 tablet        Take 1 tablet by mouth   Refills:  0        DAILY DANIELITO Tabs        Refills:  0        doxazosin 2 MG tablet   Commonly known as:  CARDURA   Dose:  2 mg        Take 2 mg by mouth   Refills:  0        * escitalopram 5 MG tablet   Commonly known as:  LEXAPRO   Dose:  40 mg        Take 40 mg by mouth   Refills:  0        * escitalopram 20 MG tablet   Commonly known as:  LEXAPRO   Dose:  20 mg        Take 20 mg by mouth   Refills:  0        eucerin cream        Refills:  0        ferrous gluconate 324 (38 FE) MG tablet   Commonly known as:  FERGON   Dose:  324 mg        Take 324 mg by mouth   Refills:  0        ferrous sulfate 75 (15 FE) MG/ML oral drops   Commonly known as:  KASEY-IN-SOL   Dose:  45 mg        Take 45 mg by mouth   Refills:  0        furosemide 20 MG tablet   Commonly known as:  LASIX   Dose:  20 mg        Take 20 mg by mouth   Refills:  0        gabapentin 300 MG capsule   Commonly known as:  NEURONTIN   Dose:  600 mg        Take 600 mg by mouth   Refills:  0        HYDROcodone-acetaminophen  MG per tablet   Commonly known as:  NORCO   Dose:  1 tablet        Take 1 tablet by mouth   Refills:  0        * insulin aspart 100 UNIT/ML injection   Commonly known as:  NovoLOG PEN   Dose:  15 Units        Inject 15 Units Subcutaneous   Refills:  0        * insulin aspart 100 UNITS/ML injection   Commonly known as:  NovoLOG   Dose:  8 Units        Inject 8 Units Subcutaneous   Refills:  0        * insulin isophane & regular susp   Commonly known as:  HumuLIN MIX 70/30 PEN , NovoLIN MIX 70/30 PEN   Dose:  30 Units        Inject 30 Units Subcutaneous   Refills:  0        * insulin NPH-insulin regular injection   Commonly known as:  HumuLIN MIX 70/30/NovoLIN MIX 70/30   Dose:  18 " Units        Inject 18 Units Subcutaneous   Refills:  0        LACTOBACILLUS ACID-PECTIN PO        Refills:  0        lactulose 10 GM/15ML solution   Commonly known as:  CHRONULAC   Dose:  15 mL        Take 15 mLs by mouth   Refills:  0        losartan 25 MG tablet   Commonly known as:  COZAAR   Dose:  25 mg        Take 25 mg by mouth   Refills:  0        magnesium oxide 400 MG tablet   Commonly known as:  MAG-OX   Dose:  400 mg        Take 400 mg by mouth   Refills:  0        mometasone-formoterol 100-5 MCG/ACT oral inhaler   Commonly known as:  DULERA   Dose:  2 puff        Inhale 2 puffs into the lungs   Refills:  0        NOVOFINE 30G X 8 MM   Generic drug:  insulin pen needle        Refills:  0        omeprazole 20 MG CR capsule   Commonly known as:  priLOSEC   Dose:  20 mg        Take 20 mg by mouth   Refills:  0        oxyCODONE 5 MG capsule   Commonly known as:  OXY-IR   Dose:  5 mg        Take 5 mg by mouth   Refills:  0        pirfenidone 267 MG capsule   Commonly known as:  ESBRIET   Dose:  1 capsule        Take 1 capsule by mouth   Refills:  0        rifaximin 550 MG Tabs tablet   Commonly known as:  XIFAXAN   Dose:  550 mg        Take 550 mg by mouth   Refills:  0        THERA-TABS M Tabs   Dose:  1 tablet        Take 1 tablet by mouth   Refills:  0        VITAMIN A & D PO        Refills:  0        Vitamin D3 400 UNITS Caps   Dose:  400 Units        Take 400 Units by mouth   Refills:  0        * Notice:  This list has 6 medication(s) that are the same as other medications prescribed for you. Read the directions carefully, and ask your doctor or other care provider to review them with you.            Procedures and tests performed during your visit     Basic metabolic panel    CBC with platelets differential    Chest XR,  PA & LAT    EKG 12 lead    Hepatic panel    ISTAT CG4 gases lactate angel nursing POCT    ISTAT gases lactate angel POCT    Nt probnp inpatient    Troponin I    US Lower Extremity Venous  "Duplex Bilateral    Upper extremities, bilateral, venous US      Orders Needing Specimen Collection     None      Pending Results     Date and Time Order Name Status Description    2017 1828 Upper extremities, bilateral, venous US Preliminary     2017 1828 US Lower Extremity Venous Duplex Bilateral Preliminary     2017 1536 EKG 12 lead Preliminary             Pending Culture Results     No orders found from 2017 to 2017.            Pending Results Instructions     If you had any lab results that were not finalized at the time of your Discharge, you can call the ED Lab Result RN at [phone]. You will be contacted by this team for any positive Lab results or changes in treatment. The nurses are available 7 days a week from 10A to 6:30P.  You can leave a message 24 hours per day and they will return your call.        Thank you for choosing [facility]       Thank you for choosing [facility] for your care. Our goal is always to provide you with excellent care. Hearing back from our patients is one way we can continue to improve our services. Please take a few minutes to complete the written survey that you may receive in the mail after you visit with us. Thank you!        [employer] Information     [facility] lets you send messages to your doctor, view your test results, renew your prescriptions, schedule appointments and more. To sign up, go to [email or web address] . Click on \"Log in\" on the left side of the screen, which will take you to the Welcome page. Then click on \"Sign up Now\" on the right side of the page.     You will be asked to enter the access code listed below, as well as some personal information. Please follow the directions to create your username and password.     Your access code is: [email or web address]  Expires: 2017  4:10 PM     Your access code will  in 90 days. If you need help or a new code, please call your [facility] clinic or [phone].        Care EveryWhere ID     " This is your Care EveryWhere ID. This could be used by other organizations to access your [facility] medical records  [id number]        Equal Access to Services     PINKY TSE : Janae Guerra, jakob marc, michelle richardson, sabina monterroso. So [facility] [phone].    ATENCIÓN: Si habla español, tiene a aponte disposición servicios gratuitos de asistencia lingüística. Llame al [phone].    We comply with applicable federal civil rights laws and [place] laws. We do not discriminate on the basis of race, color, national origin, age, disability sex, sexual orientation or gender identity.            After Visit Summary       This is your record. Keep this with you and show to your community pharmacist(s) and doctor(s) at your next visit.

## 2017-06-22 NOTE — ED PROVIDER NOTES
History     Chief Complaint   Patient presents with     Shortness of Breath     HPI  Yovany Carr is a 67 year old male with a history of IPF on 3-4 L oxygen chronically, cirrhosis, esophageal varices, CHF, NAFLD, GERD, mediastinal adenopathy, and diabetes who presents to the Emergency Department for evaluation of shortness of breath. Patient was seen and evaluated in pulmonary clinic for a pre-lung transplant appointment.  It sounds like he was doing PFTs, however, while being examined became hypoxic. Patient was placed on 10 L high flow oxygen in clinic, but remained hypoxic so was sent here to the ED for further evaluation. Patient is chronically on 3-4 L of home oxygen. He denies any increase in his baseline shortness of breath, but notes he has been more fatigued and generally weak recently. His wife states at baseline the patient is only able to walk 1-2 blocks at baseline. He also reports a cough that has been ongoing for some time. He denies fever, but has felt subjectively warm to his wife. No chest pain, palpitations, or new/increased leg swelling. Patient did drive 3.5 hours here to [place] from his hometown last night. No history of DVT or PE. He is compliant with all of his prescribed medications. He has been able to eat and drink normally. Patient was a smoker, smoked for about 30 years, but quit in 2000.     Past Medical History:   Diagnosis Date     CHF (congestive heart failure) (H)      Cirrhosis (H) 06/13/2013     DM (diabetes mellitus screen) 01/11/2013     Esophageal varices (H) 09/09/2013     GERD (gastroesophageal reflux disease)      IPF (idiopathic pulmonary fibrosis) (H) 05/30/2013     Mediastinal adenopathy 08/17/2012     NAFLD (nonalcoholic fatty liver disease)        Past Surgical History:   Procedure Laterality Date     ARTHROSCOPY KNEE Left 1998     CHOLECYSTECTOMY, LAPOROSCOPIC       CYSTOSCOPY  10/22/2013    with stent placement     OTHER SURGICAL HISTORY  11/21/2002     "diskectomy, spinal fusion     OTHER SURGICAL HISTORY      Extracorpeal shock wave lithotripsy     OTHER SURGICAL HISTORY  08/28/2012    Mediastinoscopy       Family History   Problem Relation Age of Onset     Uterine Cancer Mother      Colon Cancer Mother      Breast Cancer Father      Breast Cancer Sister      Uterine Cancer Sister        Social History   Substance Use Topics     Smoking status: Former Smoker     Packs/day: 1.00     Years: 32.00     Types: Cigarettes     Quit date: 1/1/2000     Smokeless tobacco: Not on file     Alcohol use Yes      Comment: occ on weekends from age 18- 23       No current facility-administered medications for this encounter.      Current Outpatient Prescriptions   Medication     insulin syringe-needle U-100 (BD INSULIN SYRINGE ULTRAFINE) 30G X 1/2\" 1 ML     calcium citrate-vitamin D (CITRACAL) 315-250 MG-UNIT TABS per tablet     doxazosin (CARDURA) 2 MG tablet     Multiple Vitamins-Minerals (THERA-TABS M) TABS     ferrous gluconate (FERGON) 324 (38 FE) MG tablet     furosemide (LASIX) 20 MG tablet     HYDROcodone-acetaminophen (NORCO)  MG per tablet     insulin aspart (NOVOLOG) 100 UNITS/ML injection     insulin NPH-insulin regular (HUMULIN MIX 70/30/NOVOLIN MIX 70/30) injection     magnesium oxide (MAG-OX) 400 MG tablet     insulin pen needle (NOVOFINE) 30G X 8 MM     omeprazole (PRILOSEC) 20 MG CR capsule     rifaximin (XIFAXAN) 550 MG TABS tablet     ascorbic acid 500 MG TABS     Cholecalciferol (VITAMIN D3) 400 UNITS CAPS     escitalopram (LEXAPRO) 20 MG tablet     ferrous sulfate (KASEY-IN-SOL) 75 (15 FE) MG/ML oral drops     insulin aspart (NOVOLOG PEN) 100 UNIT/ML injection     insulin isophane & regular (HUMULIN MIX 70/30 PEN , NOVOLIN MIX 70/30 PEN) susp     Multiple Vitamin (DAILY DANIELITO) TABS     Vitamins A & D (VITAMIN A & D PO)     escitalopram (LEXAPRO) 5 MG tablet     pirfenidone (ESBRIET) 267 MG capsule     albuterol (PROAIR HFA/PROVENTIL HFA/VENTOLIN HFA) 108 " "(90 BASE) MCG/ACT Inhaler     gabapentin (NEURONTIN) 300 MG capsule     LACTOBACILLUS ACID-PECTIN PO     lactulose (CHRONULAC) 10 GM/15ML solution     losartan (COZAAR) 25 MG tablet     mometasone-formoterol (DULERA) 100-5 MCG/ACT oral inhaler     oxyCODONE (OXY-IR) 5 MG capsule     Skin Protectants, Misc. (EUCERIN) cream        Allergies   Allergen Reactions     Acetaminophen-Codeine      Other reaction(s): Nausea  States can take liquid tylenol  Plain tylenol     Atorvastatin Itching     Codeine Nausea     In pill form, can tolerate it in syrup     Insulin Glargine Other (See Comments)     Does not work to decrease pts blood sugars when pt is ill.  Other reaction(s): Other (Specify in Comments)  Ineffective     Levofloxacin Other (See Comments)     Thrush, c/ diff  Other reaction(s): Other (Specify in Comments)  Thrush, c/ diff     Lisinopril Cough     Other reaction(s): Cough     Paroxetine      Other reaction(s): Other (Specify in Comments)  \"BUZZED\"     Propoxyphene      Propoxyphene N-Apap      Other reaction(s): Nausea     Prednisone      Other reaction(s): Other (Specify in Comments)  By mouth prednisone Caused extreme elevated Blood Sugars     I have reviewed the Medications, Allergies, Past Medical and Surgical History, and Social History in the Epic system.    Review of Systems   Constitutional: Positive for fatigue. Negative for chills and fever.   HENT: Negative for congestion and sore throat.    Respiratory: Positive for cough. Negative for shortness of breath and wheezing.         No change in baseline SOB   Cardiovascular: Negative for chest pain, palpitations and leg swelling.   Gastrointestinal: Negative for abdominal pain, diarrhea, nausea and vomiting.   Genitourinary: Negative for difficulty urinating.   Skin: Negative for color change.   Neurological: Positive for weakness (generalized). Negative for headaches.   Psychiatric/Behavioral: Negative for confusion.   All other systems reviewed and " are negative.      Physical Exam      ED Triage Vitals   Enc Vitals Group      BP 06/22/17 1513 152/66      Pulse --       Resp 06/22/17 1513 16      Temp 06/22/17 1513 98  F (36.7  C)      Temp src 06/22/17 1513 Oral      SpO2 06/22/17 1513 94 %      Weight 06/22/17 1513 93.3 kg (205 lb 9.6 oz)      Height 06/22/17 1513 1.829 m (6')       Physical Exam   Constitutional: No distress.   Pleasant, alert, cooperative, NAD.  On nasal cannula   HENT:   Head: Atraumatic.   Mouth/Throat: Oropharynx is clear and moist. No oropharyngeal exudate.   Eyes: Pupils are equal, round, and reactive to light. No scleral icterus.   Cardiovascular: Normal rate, regular rhythm, normal heart sounds and intact distal pulses.    Pulmonary/Chest: Effort normal. No respiratory distress.   Dry fine crackles both bases, no wheezing, no increased work of breathing, speaking in full sentences   Abdominal: Soft. Bowel sounds are normal. There is no tenderness.   Musculoskeletal: He exhibits no edema or tenderness.   Skin: Skin is warm. No rash noted. He is not diaphoretic.   Nursing note and vitals reviewed.      ED Course     ED Course     Procedures       3:28 PM  The patient was seen and examined by Dr. Rodríguez in Room 22.          EKG Interpretation:      Interpreted by Shantelle Rodríguez  Time reviewed: 1600  Symptoms at time of EKG: None   Rhythm: normal sinus   Rate: Normal  Axis: Left Axis Deviation  Ectopy: none  Conduction: QTc prolonged 494 ms  ST Segments/ T Waves: No acute ischemic changes  Q Waves: none  Comparison to prior: No old EKG available    Clinical Impression: non-specific EKG and prolonged QT interval                Critical Care time:  none               Results for orders placed or performed during the hospital encounter of 06/22/17 (from the past 24 hour(s))   CBC with platelets differential   Result Value Ref Range    WBC 3.2 (L) 4.0 - 11.0 10e9/L    RBC Count 4.30 (L) 4.4 - 5.9 10e12/L    Hemoglobin 13.1 (L) 13.3 -  17.7 g/dL    Hematocrit 40.5 40.0 - 53.0 %    MCV 94 78 - 100 fl    MCH 30.5 26.5 - 33.0 pg    MCHC 32.3 31.5 - 36.5 g/dL    RDW 16.2 (H) 10.0 - 15.0 %    Platelet Count 58 (L) 150 - 450 10e9/L    Diff Method Automated Method     % Neutrophils 65.1 %    % Lymphocytes 22.5 %    % Monocytes 8.3 %    % Eosinophils 3.2 %    % Basophils 0.6 %    % Immature Granulocytes 0.3 %    Nucleated RBCs 0 0 /100    Absolute Neutrophil 2.1 1.6 - 8.3 10e9/L    Absolute Lymphocytes 0.7 (L) 0.8 - 5.3 10e9/L    Absolute Monocytes 0.3 0.0 - 1.3 10e9/L    Absolute Eosinophils 0.1 0.0 - 0.7 10e9/L    Absolute Basophils 0.0 0.0 - 0.2 10e9/L    Abs Immature Granulocytes 0.0 0 - 0.4 10e9/L    Absolute Nucleated RBC 0.0    Basic metabolic panel   Result Value Ref Range    Sodium 142 133 - 144 mmol/L    Potassium 4.0 3.4 - 5.3 mmol/L    Chloride 110 (H) 94 - 109 mmol/L    Carbon Dioxide 27 20 - 32 mmol/L    Anion Gap 5 3 - 14 mmol/L    Glucose 139 (H) 70 - 99 mg/dL    Urea Nitrogen 20 7 - 30 mg/dL    Creatinine 1.15 0.66 - 1.25 mg/dL    GFR Estimate 64 >60 mL/min/1.7m2    GFR Estimate If Black 77 >60 mL/min/1.7m2    Calcium 8.7 8.5 - 10.1 mg/dL   Troponin I   Result Value Ref Range    Troponin I ES  0.000 - 0.045 ug/L     <0.015  The 99th percentile for upper reference range is 0.045 ug/L.  Troponin values in   the range of 0.045 - 0.120 ug/L may be associated with risks of adverse   clinical events.     Hepatic panel   Result Value Ref Range    Bilirubin Direct 0.4 (H) 0.0 - 0.2 mg/dL    Bilirubin Total 1.4 (H) 0.2 - 1.3 mg/dL    Albumin 2.4 (L) 3.4 - 5.0 g/dL    Protein Total 6.5 (L) 6.8 - 8.8 g/dL    Alkaline Phosphatase 213 (H) 40 - 150 U/L    ALT 43 0 - 70 U/L    AST 51 (H) 0 - 45 U/L   Nt probnp inpatient   Result Value Ref Range    N-Terminal Pro BNP Inpatient 208 0 - 900 pg/mL   ISTAT gases lactate angel POCT   Result Value Ref Range    Ph Venous 7.40 7.32 - 7.43 pH    PCO2 Venous 41 40 - 50 mm Hg    PO2 Venous 46 25 - 47 mm Hg     Bicarbonate Venous 25 21 - 28 mmol/L    O2 Sat Venous 81 %    Lactic Acid 1.3 0.7 - 2.1 mmol/L   EKG 12 lead   Result Value Ref Range    Interpretation ECG Click View Image link to view waveform and result    Chest XR,  PA & LAT    Narrative    XR CHEST 2 VW  6/22/2017 4:06 PM      HISTORY: sob    COMPARISON: 11/23/2016    FINDINGS: PA and lateral views of the chest upright. Cardiac  silhouette is stable. Pulmonary vasculature is prominent. Elevation of  the right hemidiaphragm. Mild scattered diffuse patchy opacities  bilaterally superimposed on chronic fibrotic changes. No pleural  effusion or pneumothorax. The visualized upper abdomen, osseous  structures, and soft tissues are unremarkable.      Impression    IMPRESSION: Prominent pulmonary vasculature is not mild interstitial  prominence and patchy airspace opacities consistent with early  pulmonary edema. These findings are superimposed on chronic fibrotic  changes, which appear similar to the previous study.    I have personally reviewed the examination and initial interpretation  and I agree with the findings.    JANINE BARON MD   US Lower Extremity Venous Duplex Bilateral    Narrative    EXAMINATION: US lower extremity venous duplex bilateral, 6/22/2017  7:22 PM     COMPARISON: None.    HISTORY: Sudden onset hypoxia, eval for DVT    TECHNIQUE:  Gray-scale evaluation with compression, spectral flow and  color Doppler assessment of the deep venous system of both legs from  groin to knee, and then at the ankles.    FINDINGS:  In the both lower extremities, the common femoral, femoral, popliteal  and posterior tibial veins demonstrate normal compressibility and  blood flow.        Impression    IMPRESSION:  No evidence of deep venous thrombosis in either lower extremity.    I have personally reviewed the examination and initial interpretation  and I agree with the findings.    JANINE BARON MD   Upper extremities, bilateral, venous US    Narrative     EXAMINATION: DOPPLER VENOUS ULTRASOUND OF BILATERAL UPPER EXTREMTIES,  6/22/2017 7:23 PM     COMPARISON: None available    HISTORY: Sudden onset hypoxia, evaluate for DVT.    TECHNIQUE:  Gray-scale evaluation with compression, spectral flow, and  color Doppler assessment of the deep venous system of both upper  extremities.    FINDINGS:  Normal blood flow and waveforms are demonstrated in the internal  jugular, innominate, subclavian, and axillary veins bilaterally. There  is normal compressibility of the brachial, basilic and cephalic veins  bilaterally.      Impression    IMPRESSION:  No evidence of deep venous thrombosis in either upper extremity.    I have personally reviewed the examination and initial interpretation  and I agree with the findings.    JANINE BARON MD              Assessments & Plan (with Medical Decision Making)   This is a 67 year old male who is new to our system who was being seen at the Pulmonary clinic today for a pre-transplant evaluation for a possible lung transplant placement secondary to his known interstitial lung disease who presents to the Emergency Department today with hypoxia. Evidently the patient has been on chronic oxygen for quite some time, having received the diagnosis of interstitial lung disease about 6 months ago. He is normally on 3 liters via nasal canula. He arrived at the Pulmonary clinic today to do his routine pre-transplant referral and they noted that his oxygen saturations were 82% on 3 L. He specifically denied any shortness of breath or any symptoms at that time. They did increase his oxygen and they did need to raise him up to 6 liters via nasal canula to obtain oxygen saturation of 94%. He was instructed to come to the Emergency Department for further evaluation of this. Upon asking the patient further information, he denies being short of breath during this time, he had walked back from the waiting room to the exam room and it sounds as if he had just  done a peak flow or possible incentive spirometry measurement, he had not yet started his 6 minute walk test.     Here in the Emergency Department initial oxygen saturation for 94% on 6 liters. He is alert and cooperative, in no distress. He has some fine crackles in both bases consistent with his ILD. He is denying any chest pain or swelling.     Differential diagnosis is extensive. Working him up for PE is going to be significantly problematic. He has a degree of chronic kidney insufficiency at baseline and also has a solitary kidney at baseline so using IV contrast is not going to be advisable. The VQ scan is not going to be helpful in this patient with known lung disease. We can certainly do a bilateral lower extremity ultrasound to evaluate for any sign of DVT, if present then I do think it may be reasonable to treat for that.     Infection is always a possibility, he denies any change in cough, productive sputum, or fevers. Cardiac etiology is possible though again he is denying any chest pain or palpitations.     It is also entirely possible that the patient has been likely dropping his oxygen saturation on a regular basis with minimal exertion. He does not have an oximeter at home and so he may not recognize this, particularly if he is asymptomatic during this which it appears he has been at least today.     We did establish IV access and we did draw blood for lab analysis to do basic work up. CBC shows pancytopenia consistent with his known liver disease, white count is 3.2, hemoglobin is 13.1 and platelet count is 58,000.  We do not have any prior in our system for comparison.  Basic metabolic panel is essentially within normal limits with a creatinine of 1.15, troponin is negative.  Hepatic panel shows evidence of bilirubin of 1.4, alk phos of 213, ALT of 43 and of AST 51, again patient has known end-stage liver disease.  BNP is 208 within normal limits.  I-STAT VBG showed a pH of 7.40 with a PCO2 of 41,  PO2 of 46, bicarbonate of 25, lactate of 1.3 and this was on 4 L.    Chest x-ray was read by radiology as prominent pulmonary vasculature with mild interstitial prominence and patchy airspace opacities potentially consistent with early pulmonary edema.  However his BNP is within normal limits.  He has chronic fibrotic changes which are similar to prior studies. No productive cough.  Not concerned about infection at this point, and with a normal BNP, doubt pulmonary edema.    The patient is back to baseline, is saturating well on 3-4 L which again is his baseline.  I did speak to Dr. Greer who saw him in clinic.  Dr. Greer s concern was that he seemed to desaturate very quickly and not recover quickly which is why he sent him to the emergency department.  The patient appears fine now, I suspect that this may be his baseline and as he is asymptomatic with this he may be desaturating on a regular basis with physical activity without him being aware of it.  The pulmonologist did request that we do bilateral upper and lower extremity ultrasounds to evaluate for any DVT.  These are negative for DVT.    At this point I do not see any new or acute problem.  He has known baseline interstitial lung disease and is chronically oxygen dependent.  I think it is very reasonable for him to continue to follow up with his clinic.  He should call the pulmonary clinic tomorrow to try to receive some instruction from them about what they would prefer he do in terms of completing his pretransplant workup.  Patient and family verbalized understanding.    This part of the medical record was transcribed by Raheem Riley Scribe, from a dictation done by Shantelle Rodríguez MD.      I have reviewed the nursing notes.    I have reviewed the findings, diagnosis, plan and need for follow up with the patient.    Discharge Medication List as of 6/22/2017  8:14 PM          Final diagnoses:   ILD (interstitial lung disease) (H)   [place] HERRMANN  GLORY Zelaya, am serving as a trained medical scribe to document services personally performed by Shantelle Rodríguez MD, based on the provider's statements to me.   I, Shantelle Rodríguez MD, was physically present and have reviewed and verified the accuracy of this note documented by Philly Zelaya.      6/22/2017   [facility], [place], EMERGENCY DEPARTMENT     Shantelle Rodríguez MD  06/22/17 1464

## 2017-06-23 ENCOUNTER — CARE COORDINATION (OUTPATIENT)
Dept: PULMONOLOGY | Facility: CLINIC | Age: 67
End: 2017-06-23

## 2017-06-23 ENCOUNTER — RADIANT APPOINTMENT (OUTPATIENT)
Dept: CARDIOLOGY | Facility: CLINIC | Age: 67
End: 2017-06-23
Attending: INTERNAL MEDICINE

## 2017-06-23 DIAGNOSIS — J84.9 ILD (INTERSTITIAL LUNG DISEASE) (H): Primary | ICD-10-CM

## 2017-06-23 DIAGNOSIS — R06.02 SOB (SHORTNESS OF BREATH): Primary | ICD-10-CM

## 2017-06-23 DIAGNOSIS — J84.9 ILD (INTERSTITIAL LUNG DISEASE) (H): ICD-10-CM

## 2017-06-23 DIAGNOSIS — R06.02 SOB (SHORTNESS OF BREATH): ICD-10-CM

## 2017-06-23 RX ORDER — MYCOPHENOLATE MOFETIL 500 MG/1
500 TABLET ORAL 2 TIMES DAILY
Qty: 60 TABLET | Refills: 11 | Status: SHIPPED | OUTPATIENT
Start: 2017-06-23

## 2017-06-23 RX ORDER — PREDNISONE 10 MG/1
10 TABLET ORAL DAILY
Qty: 30 TABLET | Refills: 3 | Status: SHIPPED | OUTPATIENT
Start: 2017-06-23

## 2017-06-23 RX ORDER — SULFAMETHOXAZOLE AND TRIMETHOPRIM 400; 80 MG/1; MG/1
TABLET ORAL
Qty: 30 TABLET | Refills: 11 | Status: SHIPPED | OUTPATIENT
Start: 2017-06-23

## 2017-06-23 NOTE — DISCHARGE INSTRUCTIONS
You have been seen in the ER for evaluation of low oxygen saturations.  All of your blood work looks normal (or normal for you - your liver function tests will always be abnormal due to your liver disease).  Your ultrasounds do not show any sign of blood clot.  You do not have any clinical sign of infection at this time.  We have discussed this with the pulmonary clinic and they are recommending that we discharge you to home, continue your regular home oxygen.  You should call the pulmonary clinic tomorrow to discuss what the next steps are for you.      Please call the Pulmonology Clinic at [phone] tomorrow.

## 2017-06-23 NOTE — PROGRESS NOTES
Requested by Dr. Greer to contact patient regarding echocardiogram results.  Echocardiogram normal.  Patient and wife given results and verbalize understanding.

## 2017-10-02 LAB
EXPTIME-PRE: 6.62 SEC
FEF2575-%PRED-PRE: 120 %
FEF2575-PRE: 3.38 L/SEC
FEF2575-PRED: 2.8 L/SEC
FEFMAX-%PRED-PRE: 98 %
FEFMAX-PRE: 9.01 L/SEC
FEFMAX-PRED: 9.17 L/SEC
FEV1-%PRED-PRE: 68 %
FEV1-PRE: 2.44 L
FEV1FEV6-PRE: 85 %
FEV1FEV6-PRED: 78 %
FEV1FVC-PRE: 85 %
FEV1FVC-PRED: 76 %
FIFMAX-PRE: 3.05 L/SEC
FVC-%PRED-PRE: 61 %
FVC-PRE: 2.87 L
FVC-PRED: 4.69 L

## 2017-10-31 DIAGNOSIS — J84.9 ILD (INTERSTITIAL LUNG DISEASE) (H): Primary | ICD-10-CM

## 2017-10-31 NOTE — Clinical Note
Jose Leon, You can cancel the appt for this patient with Dr. Givens (?sp).  We are going to have him see our Alma Loomis up at [facility]. Then--I have confirmed for him to see Jeane on Thursday 11/02--use 1/2 of the NPT spot--2:30 or 3:10--with full PFTs/6mw/and bubble echo. Let me know if this doesn't work. Can confirm by email to patient. Thanks, Paty

## 2017-10-31 NOTE — PROGRESS NOTES
Have spoken several times over past few weeks with Symone from Dr. Connie Hedrick's office, ([phone], direct [phone]), Margaret from [facility] [phone], and from Dr. Connie Hedrick.    Prednisone had been started as recommended in June, however mycophenolate was not started until September, and Bactrim prophylaxis more recently.    Yovany's wife passed away unexpectedly on November 9th.  His daughters are very supportive.    Plan was to arrange follow-up with Dr. Greer as well as to see a Transplant Hepatologist on behalf of potential candidacy for lung-liver transplant which would need to be referred elsewhere if appropriate.     Dr. Alma Loomis sees patients in conjunction with Dr. Best at [facility].  Will finalize a plan for Yovany to see her there potentially on 11/13.  Contacted Yovany to discuss this option, that would work for him.    Then discussed follow-up with Dr. Greer on 11/02, this Thursday.  He can make that appointment.  Will schedule PFTs and 6mw in conjunction with appointment and confirm with Yovany.  Discussed also with his daughter Ambar.  Will obtain CT images from June done at [facility].

## 2017-11-14 ENCOUNTER — DOCUMENTATION ONLY (OUTPATIENT)
Dept: TRANSPLANT | Facility: CLINIC | Age: 67
End: 2017-11-14

## 2017-11-16 ENCOUNTER — OFFICE VISIT (OUTPATIENT)
Dept: PULMONOLOGY | Facility: CLINIC | Age: 67
End: 2017-11-16
Attending: INTERNAL MEDICINE
Payer: MEDICARE

## 2017-11-16 VITALS
HEART RATE: 76 BPM | RESPIRATION RATE: 20 BRPM | DIASTOLIC BLOOD PRESSURE: 61 MMHG | SYSTOLIC BLOOD PRESSURE: 141 MMHG | OXYGEN SATURATION: 93 %

## 2017-11-16 DIAGNOSIS — J84.9 ILD (INTERSTITIAL LUNG DISEASE) (H): Primary | ICD-10-CM

## 2017-11-16 DIAGNOSIS — J84.9 ILD (INTERSTITIAL LUNG DISEASE) (H): ICD-10-CM

## 2017-11-16 DIAGNOSIS — R06.00 DYSPNEA, UNSPECIFIED TYPE: ICD-10-CM

## 2017-11-16 LAB
6 MIN WALK (FT): NORMAL FT
6 MIN WALK (FT): NORMAL FT
6 MIN WALK (M): NORMAL M
6 MIN WALK (M): NORMAL M
ALBUMIN SERPL-MCNC: 2.5 G/DL (ref 3.4–5)
ALP SERPL-CCNC: 244 U/L (ref 40–150)
ALT SERPL W P-5'-P-CCNC: 58 U/L (ref 0–70)
ANION GAP SERPL CALCULATED.3IONS-SCNC: 9 MMOL/L (ref 3–14)
AST SERPL W P-5'-P-CCNC: 77 U/L (ref 0–45)
BASOPHILS # BLD AUTO: 0 10E9/L (ref 0–0.2)
BASOPHILS NFR BLD AUTO: 0.6 %
BILIRUB SERPL-MCNC: 1 MG/DL (ref 0.2–1.3)
BUN SERPL-MCNC: 31 MG/DL (ref 7–30)
CALCIUM SERPL-MCNC: 8.1 MG/DL (ref 8.5–10.1)
CHLORIDE SERPL-SCNC: 108 MMOL/L (ref 94–109)
CO2 SERPL-SCNC: 22 MMOL/L (ref 20–32)
CREAT SERPL-MCNC: 1.55 MG/DL (ref 0.66–1.25)
DIFFERENTIAL METHOD BLD: ABNORMAL
EOSINOPHIL # BLD AUTO: 0.1 10E9/L (ref 0–0.7)
EOSINOPHIL NFR BLD AUTO: 2.2 %
ERYTHROCYTE [DISTWIDTH] IN BLOOD BY AUTOMATED COUNT: 14.9 % (ref 10–15)
GFR SERPL CREATININE-BSD FRML MDRD: 45 ML/MIN/1.7M2
GLUCOSE SERPL-MCNC: 315 MG/DL (ref 70–99)
HCT VFR BLD AUTO: 32.3 % (ref 40–53)
HGB BLD-MCNC: 10.1 G/DL (ref 13.3–17.7)
IMM GRANULOCYTES # BLD: 0 10E9/L (ref 0–0.4)
IMM GRANULOCYTES NFR BLD: 0.3 %
LYMPHOCYTES # BLD AUTO: 0.6 10E9/L (ref 0.8–5.3)
LYMPHOCYTES NFR BLD AUTO: 18.8 %
MCH RBC QN AUTO: 30.8 PG (ref 26.5–33)
MCHC RBC AUTO-ENTMCNC: 31.3 G/DL (ref 31.5–36.5)
MCV RBC AUTO: 99 FL (ref 78–100)
MONOCYTES # BLD AUTO: 0.3 10E9/L (ref 0–1.3)
MONOCYTES NFR BLD AUTO: 7.8 %
NEUTROPHILS # BLD AUTO: 2.2 10E9/L (ref 1.6–8.3)
NEUTROPHILS NFR BLD AUTO: 70.3 %
NRBC # BLD AUTO: 0 10*3/UL
NRBC BLD AUTO-RTO: 0 /100
PLATELET # BLD AUTO: 70 10E9/L (ref 150–450)
POTASSIUM SERPL-SCNC: 4.4 MMOL/L (ref 3.4–5.3)
PROT SERPL-MCNC: 6.3 G/DL (ref 6.8–8.8)
RBC # BLD AUTO: 3.28 10E12/L (ref 4.4–5.9)
SODIUM SERPL-SCNC: 139 MMOL/L (ref 133–144)
WBC # BLD AUTO: 3.2 10E9/L (ref 4–11)

## 2017-11-16 PROCEDURE — 85025 COMPLETE CBC W/AUTO DIFF WBC: CPT | Performed by: INTERNAL MEDICINE

## 2017-11-16 PROCEDURE — 80053 COMPREHEN METABOLIC PANEL: CPT | Performed by: INTERNAL MEDICINE

## 2017-11-16 PROCEDURE — 36415 COLL VENOUS BLD VENIPUNCTURE: CPT | Performed by: INTERNAL MEDICINE

## 2017-11-16 RX ORDER — SPIRONOLACTONE 25 MG/1
TABLET ORAL DAILY
COMMUNITY

## 2017-11-16 ASSESSMENT — PAIN SCALES - GENERAL: PAINLEVEL: NO PAIN (0)

## 2017-11-16 NOTE — PROGRESS NOTES
[facility]   Pulmonary Clinic Follow Up Note  November 16, 2017      Yovany Carr MRN# [id number]   Age: 67 year old YOB: 1950     Date of Consultation: November 16, 2017    Primary care provider: Charlette Carr     History taken from; Patient      Yovany Carr is a 67 year old male seen in the Pulmonary Clinic  for f/u.  Chief Complaint   Patient presents with     Transplant Referral   .          Pulmonary Problem List / Reason for follow up:   1. ILD           Assessment and Plan:        ASSESSMENT AND PLAN:  The patient is a 67-year-old gentleman with a history of liver and lung failure, and kidney injury.  He is coming for pulmonary followup.  The patient is doing worse.   1.  Interstitial lung disease.  We will repeat CT of the chest.  The patient will have an echocardiogram.  We will check a CBC and CMP because patient is on mycophenolate and prednisone.  For now, the patient will continue with these medications.  We will refer the patient data to [facility] to see if they will be interested to consider the patient for liver and lung transplant.      We explained the plan to the patient, including the risks and benefits.  The patient expressed understanding and agreed with the plan.      Thank you very much for the opportunity to participate in the care of this very pleasant patient.         Return visit in 3 months      Jeane Greer M.D.         History of Present Illness / Interval History:     CHIEF COMPLAINT:  Interstitial lung disease.      HISTORY OF PRESENT ILLNESS:  The patient is a 67-year-old gentleman with a history of interstitial lung disease, kidney injury and liver failure.  The patient was initially coming here for evaluation for a lung transplant.  Unfortunately, he will need a liver and a lung transplant.  The patient has renal insufficiency, which possibly will be exclusion for consideration of the lung transplant.  The patient was  "seen here 6 months ago.  The patient required high doses of oxygen.  Today, the patient's pulmonary function tests are worse than they were 6 months ago, although he was able to do the diffusion capacity.  The patient requires at least 10 liters of Oxymizer with activities, and, unfortunately, the patient is progressing.                 Pulmonary Data:     Exposure history: Asbestos;  No , TB;  No , Radiation;   No          Medications:     Current Outpatient Prescriptions   Medication Sig     spironolactone (ALDACTONE) 25 MG tablet Take by mouth daily     mycophenolate (CELLCEPT - GENERIC EQUIVALENT) 500 MG tablet Take 1 tablet (500 mg) by mouth 2 times daily     predniSONE (DELTASONE) 10 MG tablet Take 1 tablet (10 mg) by mouth daily     sulfamethoxazole-trimethoprim (BACTRIM) 400-80 MG per tablet Take one tablet daily.     insulin syringe-needle U-100 (BD INSULIN SYRINGE ULTRAFINE) 30G X 1/2\" 1 ML      calcium citrate-vitamin D (CITRACAL) 315-250 MG-UNIT TABS per tablet Take 1 tablet by mouth     doxazosin (CARDURA) 2 MG tablet Take 2 mg by mouth     escitalopram (LEXAPRO) 5 MG tablet Take 40 mg by mouth     ferrous gluconate (FERGON) 324 (38 FE) MG tablet Take 324 mg by mouth     furosemide (LASIX) 20 MG tablet Take 20 mg by mouth     HYDROcodone-acetaminophen (NORCO)  MG per tablet Take 1 tablet by mouth     insulin aspart (NOVOLOG) 100 UNITS/ML injection Inject 8 Units Subcutaneous     insulin NPH-insulin regular (HUMULIN MIX 70/30/NOVOLIN MIX 70/30) injection Inject 18 Units Subcutaneous     magnesium oxide (MAG-OX) 400 MG tablet Take 400 mg by mouth     insulin pen needle (NOVOFINE) 30G X 8 MM      omeprazole (PRILOSEC) 20 MG CR capsule Take 20 mg by mouth     rifaximin (XIFAXAN) 550 MG TABS tablet Take 550 mg by mouth     ascorbic acid 500 MG TABS Take 500 mg by mouth     Cholecalciferol (VITAMIN D3) 400 UNITS CAPS Take 400 Units by mouth     escitalopram (LEXAPRO) 20 MG tablet Take 20 mg by mouth     " ferrous sulfate (KASEY-IN-SOL) 75 (15 FE) MG/ML oral drops Take 45 mg by mouth     insulin aspart (NOVOLOG PEN) 100 UNIT/ML injection Inject 15 Units Subcutaneous     insulin isophane & regular (HUMULIN MIX 70/30 PEN , NOVOLIN MIX 70/30 PEN) susp Inject 30 Units Subcutaneous     LACTOBACILLUS ACID-PECTIN PO      lactulose (CHRONULAC) 10 GM/15ML solution Take 15 mLs by mouth     losartan (COZAAR) 25 MG tablet Take 25 mg by mouth     Multiple Vitamin (DAILY DANIELITO) TABS      oxyCODONE (OXY-IR) 5 MG capsule Take 5 mg by mouth     Skin Protectants, Misc. (EUCERIN) cream      Vitamins A & D (VITAMIN A & D PO)      albuterol (PROAIR HFA/PROVENTIL HFA/VENTOLIN HFA) 108 (90 BASE) MCG/ACT Inhaler Inhale 2 puffs into the lungs     No current facility-administered medications for this visit.              Past Medical History:     Past Medical History:   Diagnosis Date     CHF (congestive heart failure) (H)      Cirrhosis (H) 06/13/2013     DM (diabetes mellitus screen) 01/11/2013     Esophageal varices (H) 09/09/2013     GERD (gastroesophageal reflux disease)      IPF (idiopathic pulmonary fibrosis) (H) 05/30/2013     Mediastinal adenopathy 08/17/2012     NAFLD (nonalcoholic fatty liver disease)              Past Surgical History:     Past Surgical History:   Procedure Laterality Date     ARTHROSCOPY KNEE Left 1998     CHOLECYSTECTOMY, LAPOROSCOPIC       CYSTOSCOPY  10/22/2013    with stent placement     OTHER SURGICAL HISTORY  11/21/2002    diskectomy, spinal fusion     OTHER SURGICAL HISTORY      Extracorpeal shock wave lithotripsy     OTHER SURGICAL HISTORY  08/28/2012    Mediastinoscopy             Social History:     Social History     Social History     Marital status:      Spouse name: N/A     Number of children: N/A     Years of education: N/A     Occupational History     Not on file.     Social History Main Topics     Smoking status: Former Smoker     Packs/day: 1.00     Years: 32.00     Types: Cigarettes     Quit  date: 1/1/2000     Smokeless tobacco: Not on file     Alcohol use Yes      Comment: occ on weekends from age 18- 23     Drug use: No     Sexual activity: Not on file     Other Topics Concern     Not on file     Social History Narrative             Family History:     Family History   Problem Relation Age of Onset     Uterine Cancer Mother      Colon Cancer Mother      Breast Cancer Father      Breast Cancer Sister      Uterine Cancer Sister              Immunization:     Immunization History   Administered Date(s) Administered     Hepatitis A Not Indicated - By Hx 09/09/2013     Pneumococcal 23 valent 09/16/2003     TDAP Vaccine (Adacel) 02/24/2009     Zoster vaccine, live 04/25/2012              Review of Systems:   I have done 10 points of review systems and pertinent findings are as above, otherwise negative.           Physical Examination:   General: Alert, oriented, not in distress  B/P: 141/61, T: Data Unavailable, P: 76, R: 20  HEENT: neck supple, symmetrical, no lymph node enlargement, thyromegaly, bruit, JVD, pupils are isocoric and equally responsive to the light.   Lungs: both hemithoraces are symmetrical, normal to palpation, no dullness to percussion, auscultation of lungs revealed crackles mostly on right side  CVS: Normal S1 and S2, no additional heart sounds, murmur, rub, normal peripheral pulses  Abdomen: Bowel sounds present, soft, non tender, non distended, no organomegaly, ascitis, mass  Extremities/musculoskeletal: no peripheral edema, deformity, cyanosis, clubbing  Neurology: alert, orientedx3, no motor/sensorial deficits, cranial nerves grossly normal  Skin: no rash  Psychiatry: Mood and affect are appropriate             DATA:     CBC RESULTS:     Recent Labs   Lab Test  06/22/17   1529   WBC  3.2*   RBC  4.30*   HGB  13.1*   HCT  40.5   PLT  58*       Basic Metabolic Panel:  Recent Labs   Lab Test  06/22/17   1529   NA  142   POTASSIUM  4.0   CHLORIDE  110*   CO2  27   BUN  20   CR  1.15    GLC  139*   CAILIN  8.7       .     PFT   PFT Latest Ref Rng & Units 11/16/2017   FVC L 2.53   FEV1 L 2.04   FVC% % 54   FEV1% % 57           Thank you for allowing me participate in the care of Yovany Carr.    Jeane Greer M.D.  Associate Professor of Medicine  Pulmonary, Allergy, Critical Care and Sleep

## 2017-11-16 NOTE — NURSING NOTE
Chief Complaint   Patient presents with     Transplant Referral      Yolette Trejo CMA at 2:15 PM on 11/16/2017

## 2017-11-16 NOTE — LETTER
11/16/2017       RE: Yovany Carr  [address]  [facility] [address]     Dear Colleague,    Thank you for referring your patient, Yovany Carr, to the [facility] at [facility]. Please see a copy of my visit note below.            [facility]   Pulmonary Clinic Follow Up Note  November 16, 2017      Yovany Carr MRN# [id number]   Age: 67 year old YOB: 1950     Date of Consultation: November 16, 2017    Primary care provider: Charlette Carr     History taken from; Patient      Yovany Carr is a 67 year old male seen in the Pulmonary Clinic  for f/u.  Chief Complaint   Patient presents with     Transplant Referral   .          Pulmonary Problem List / Reason for follow up:   1. ILD           Assessment and Plan:        ASSESSMENT AND PLAN:  The patient is a 67-year-old gentleman with a history of liver and lung failure, and kidney injury.  He is coming for pulmonary followup.  The patient is doing worse.   1.  Interstitial lung disease.  We will repeat CT of the chest.  The patient will have an echocardiogram.  We will check a CBC and CMP because patient is on mycophenolate and prednisone.  For now, the patient will continue with these medications.  We will refer the patient data to [facility] to see if they will be interested to consider the patient for liver and lung transplant.      We explained the plan to the patient, including the risks and benefits.  The patient expressed understanding and agreed with the plan.      Thank you very much for the opportunity to participate in the care of this very pleasant patient.         Return visit in 3 months      Jeane Greer M.D.         History of Present Illness / Interval History:     CHIEF COMPLAINT:  Interstitial lung disease.      HISTORY OF PRESENT ILLNESS:  The patient is a 67-year-old gentleman with a history of interstitial lung disease, kidney injury  "and liver failure.  The patient was initially coming here for evaluation for a lung transplant.  Unfortunately, he will need a liver and a lung transplant.  The patient has renal insufficiency, which possibly will be exclusion for consideration of the lung transplant.  The patient was seen here 6 months ago.  The patient required high doses of oxygen.  Today, the patient's pulmonary function tests are worse than they were 6 months ago, although he was able to do the diffusion capacity.  The patient requires at least 10 liters of Oxymizer with activities, and, unfortunately, the patient is progressing.                 Pulmonary Data:     Exposure history: Asbestos;  No , TB;  No , Radiation;   No          Medications:     Current Outpatient Prescriptions   Medication Sig     spironolactone (ALDACTONE) 25 MG tablet Take by mouth daily     mycophenolate (CELLCEPT - GENERIC EQUIVALENT) 500 MG tablet Take 1 tablet (500 mg) by mouth 2 times daily     predniSONE (DELTASONE) 10 MG tablet Take 1 tablet (10 mg) by mouth daily     sulfamethoxazole-trimethoprim (BACTRIM) 400-80 MG per tablet Take one tablet daily.     insulin syringe-needle U-100 (BD INSULIN SYRINGE ULTRAFINE) 30G X 1/2\" 1 ML      calcium citrate-vitamin D (CITRACAL) 315-250 MG-UNIT TABS per tablet Take 1 tablet by mouth     doxazosin (CARDURA) 2 MG tablet Take 2 mg by mouth     escitalopram (LEXAPRO) 5 MG tablet Take 40 mg by mouth     ferrous gluconate (FERGON) 324 (38 FE) MG tablet Take 324 mg by mouth     furosemide (LASIX) 20 MG tablet Take 20 mg by mouth     HYDROcodone-acetaminophen (NORCO)  MG per tablet Take 1 tablet by mouth     insulin aspart (NOVOLOG) 100 UNITS/ML injection Inject 8 Units Subcutaneous     insulin NPH-insulin regular (HUMULIN MIX 70/30/NOVOLIN MIX 70/30) injection Inject 18 Units Subcutaneous     magnesium oxide (MAG-OX) 400 MG tablet Take 400 mg by mouth     insulin pen needle (NOVOFINE) 30G X 8 MM      omeprazole (PRILOSEC) " 20 MG CR capsule Take 20 mg by mouth     rifaximin (XIFAXAN) 550 MG TABS tablet Take 550 mg by mouth     ascorbic acid 500 MG TABS Take 500 mg by mouth     Cholecalciferol (VITAMIN D3) 400 UNITS CAPS Take 400 Units by mouth     escitalopram (LEXAPRO) 20 MG tablet Take 20 mg by mouth     ferrous sulfate (KASEY-IN-SOL) 75 (15 FE) MG/ML oral drops Take 45 mg by mouth     insulin aspart (NOVOLOG PEN) 100 UNIT/ML injection Inject 15 Units Subcutaneous     insulin isophane & regular (HUMULIN MIX 70/30 PEN , NOVOLIN MIX 70/30 PEN) susp Inject 30 Units Subcutaneous     LACTOBACILLUS ACID-PECTIN PO      lactulose (CHRONULAC) 10 GM/15ML solution Take 15 mLs by mouth     losartan (COZAAR) 25 MG tablet Take 25 mg by mouth     Multiple Vitamin (DAILY DANIELITO) TABS      oxyCODONE (OXY-IR) 5 MG capsule Take 5 mg by mouth     Skin Protectants, Misc. (EUCERIN) cream      Vitamins A & D (VITAMIN A & D PO)      albuterol (PROAIR HFA/PROVENTIL HFA/VENTOLIN HFA) 108 (90 BASE) MCG/ACT Inhaler Inhale 2 puffs into the lungs     No current facility-administered medications for this visit.              Past Medical History:     Past Medical History:   Diagnosis Date     CHF (congestive heart failure) (H)      Cirrhosis (H) 06/13/2013     DM (diabetes mellitus screen) 01/11/2013     Esophageal varices (H) 09/09/2013     GERD (gastroesophageal reflux disease)      IPF (idiopathic pulmonary fibrosis) (H) 05/30/2013     Mediastinal adenopathy 08/17/2012     NAFLD (nonalcoholic fatty liver disease)              Past Surgical History:     Past Surgical History:   Procedure Laterality Date     ARTHROSCOPY KNEE Left 1998     CHOLECYSTECTOMY, LAPOROSCOPIC       CYSTOSCOPY  10/22/2013    with stent placement     OTHER SURGICAL HISTORY  11/21/2002    diskectomy, spinal fusion     OTHER SURGICAL HISTORY      Extracorpeal shock wave lithotripsy     OTHER SURGICAL HISTORY  08/28/2012    Mediastinoscopy             Social History:     Social History     Social  History     Marital status:      Spouse name: N/A     Number of children: N/A     Years of education: N/A     Occupational History     Not on file.     Social History Main Topics     Smoking status: Former Smoker     Packs/day: 1.00     Years: 32.00     Types: Cigarettes     Quit date: 1/1/2000     Smokeless tobacco: Not on file     Alcohol use Yes      Comment: occ on weekends from age 18- 23     Drug use: No     Sexual activity: Not on file     Other Topics Concern     Not on file     Social History Narrative             Family History:     Family History   Problem Relation Age of Onset     Uterine Cancer Mother      Colon Cancer Mother      Breast Cancer Father      Breast Cancer Sister      Uterine Cancer Sister              Immunization:     Immunization History   Administered Date(s) Administered     Hepatitis A Not Indicated - By Hx 09/09/2013     Pneumococcal 23 valent 09/16/2003     TDAP Vaccine (Adacel) 02/24/2009     Zoster vaccine, live 04/25/2012              Review of Systems:   I have done 10 points of review systems and pertinent findings are as above, otherwise negative.           Physical Examination:   General: Alert, oriented, not in distress  B/P: 141/61, T: Data Unavailable, P: 76, R: 20  HEENT: neck supple, symmetrical, no lymph node enlargement, thyromegaly, bruit, JVD, pupils are isocoric and equally responsive to the light.   Lungs: both hemithoraces are symmetrical, normal to palpation, no dullness to percussion, auscultation of lungs revealed crackles mostly on right side  CVS: Normal S1 and S2, no additional heart sounds, murmur, rub, normal peripheral pulses  Abdomen: Bowel sounds present, soft, non tender, non distended, no organomegaly, ascitis, mass  Extremities/musculoskeletal: no peripheral edema, deformity, cyanosis, clubbing  Neurology: alert, orientedx3, no motor/sensorial deficits, cranial nerves grossly normal  Skin: no rash  Psychiatry: Mood and affect are appropriate              DATA:     CBC RESULTS:     Recent Labs   Lab Test  06/22/17   1529   WBC  3.2*   RBC  4.30*   HGB  13.1*   HCT  40.5   PLT  58*       Basic Metabolic Panel:  Recent Labs   Lab Test  06/22/17   1529   NA  142   POTASSIUM  4.0   CHLORIDE  110*   CO2  27   BUN  20   CR  1.15   GLC  139*   CAILIN  8.7       .     PFT   PFT Latest Ref Rng & Units 11/16/2017   FVC L 2.53   FEV1 L 2.04   FVC% % 54   FEV1% % 57           Thank you for allowing me participate in the care of Yovany Carr.    Jeane Greer M.D.  Associate Professor of Medicine  Pulmonary, Allergy, Critical Care and Sleep      Again, thank you for allowing me to participate in the care of your patient.      Sincerely,    Jeane Greer MD

## 2017-11-16 NOTE — MR AVS SNAPSHOT
After Visit Summary   11/16/2017    Yovany Carr    MRN: [id number]           Patient Information     Date Of Birth          1950        Visit Information        Provider Department      11/16/2017 4:30 PM Jeane Greer MD [facility]        Today's Diagnoses     ILD (interstitial lung disease) (H)    -  1    Dyspnea, unspecified type          Care Instructions    Your coordinator will call you with test results from the labs,CT scan and echo.  Please call with any questions.           Follow-ups after your visit        Your next 10 appointments already scheduled     Nov 16, 2017  4:45 PM CST   Lab with  LAB   [facility] ([facility])    [address] [place] [address] [id number] [phone]            Nov 16, 2017  8:00 PM CST   (Arrive by 7:45 PM)   CT CHEST W/O CONTRAST with UCCT1   [facility] ([facility])    [address] [place] [address] [id number] [phone]           Please bring any scans or X-rays taken at other hospitals, if similar tests were done. Also bring a list of your medicines, including vitamins, minerals and over-the-counter drugs. It is safest to leave personal items at home.  Be sure to tell your doctor:   If you have any allergies.   If there s any chance you are pregnant.   If you are breastfeeding.   If you have any special needs.  You do not need to do anything special to prepare.  Please wear loose clothing, such as a sweat suit or jogging clothes. Avoid snaps, zippers and other metal. We may ask you to undress and put on a hospital gown.            Nov 20, 2017 11:30 AM CST   Ech Complete with [id number]   [facility] [place]  [facility] ([facility] [place])    [address] [facility] [id number] [phone]           1. Please bring or wear a comfortable two-piece outfit. 2. You may eat,  "drink and take your normal medicines. 3. For any questions that cannot be answered, please contact the ordering physician              Future tests that were ordered for you today     Open Future Orders        Priority Expected Expires Ordered    Echocardiogram Complete Routine  2018    CBC with platelets differential Routine  2017    Comprehensive metabolic panel Routine  2017            Who to contact     If you have questions or need follow up information about today's clinic visit or your schedule please contact [facility] directly at [phone].  Normal or non-critical lab and imaging results will be communicated to you by [employer], letter or phone within 4 business days after the clinic has received the results. If you do not hear from us within 7 days, please contact the clinic through Clinicbook[facility] or phone. If you have a critical or abnormal lab result, we will notify you by phone as soon as possible.  Submit refill requests through [employer] or call your pharmacy and they will forward the refill request to us. Please allow 3 business days for your refill to be completed.          Additional Information About Your Visit        MyChart Information     [employer] lets you send messages to your doctor, view your test results, renew your prescriptions, schedule appointments and more. To sign up, go to [email or web address] . Click on \"Log in\" on the left side of the screen, which will take you to the Welcome page. Then click on \"Sign up Now\" on the right side of the page.     You will be asked to enter the access code listed below, as well as some personal information. Please follow the directions to create your username and password.     Your access code is: [email or web address]  Expires: 2018  4:35 PM     Your access code will  in 90 days. If you need help or a new code, please call your [place] clinic or [phone].        Care " EveryWhere ID     This is your Care EveryWhere ID. This could be used by other organizations to access your [facility] medical records  [id number]        Your Vitals Were     Pulse Respirations Pulse Oximetry             76 20 93%          Blood Pressure from Last 3 Encounters:   11/16/17 141/61   06/22/17 150/58   06/22/17 149/68    Weight from Last 3 Encounters:   06/22/17 93.3 kg (205 lb 9.6 oz)   02/15/17 95.7 kg (211 lb)              We Performed the Following     CT Chest w/o Contrast        Primary Care Provider Office Phone # Fax #    Charlette KEMAL Carr [phone]       [address] [address]        Equal Access to Services     PINKY TSE : Hadii aad ku hadasho Soharmanali, waaxda luqadaha, qaybta kaalmada adeegyada, sabina monterroso. So [facility] [phone].    ATENCIÓN: Si habla español, tiene a aponte disposición servicios gratuitos de asistencia lingüística. [facility] [phone].    We comply with applicable federal civil rights laws and [place] laws. We do not discriminate on the basis of race, color, national origin, age, disability, sex, sexual orientation, or gender identity.            Thank you!     Thank you for choosing [facility]  for your care. Our goal is always to provide you with excellent care. Hearing back from our patients is one way we can continue to improve our services. Please take a few minutes to complete the written survey that you may receive in the mail after your visit with us. Thank you!             Your Updated Medication List - Protect others around you: Learn how to safely use, store and throw away your medicines at www.disposemymeds.org.          This list is accurate as of: 11/16/17  4:35 PM.  Always use your most recent med list.                   Brand Name Dispense Instructions for use Diagnosis    albuterol 108 (90 BASE) MCG/ACT Inhaler    PROAIR HFA/PROVENTIL  "HFA/VENTOLIN HFA     Inhale 2 puffs into the lungs        ascorbic acid 500 MG Tabs      Take 500 mg by mouth        BD insulin syringe ultrafine 30G X 1/2\" 1 ML   Generic drug:  insulin syringe-needle U-100           calcium citrate-vitamin D 315-250 MG-UNIT Tabs per tablet    CITRACAL     Take 1 tablet by mouth        DAILY DANIELITO Tabs           doxazosin 2 MG tablet    CARDURA     Take 2 mg by mouth        * escitalopram 5 MG tablet    LEXAPRO     Take 40 mg by mouth        * escitalopram 20 MG tablet    LEXAPRO     Take 20 mg by mouth        eucerin cream           ferrous gluconate 324 (38 FE) MG tablet    FERGON     Take 324 mg by mouth        ferrous sulfate 75 (15 FE) MG/ML oral drops    KASEY-IN-SOL     Take 45 mg by mouth        furosemide 20 MG tablet    LASIX     Take 20 mg by mouth        HYDROcodone-acetaminophen  MG per tablet    NORCO     Take 1 tablet by mouth        * insulin aspart 100 UNIT/ML injection    NovoLOG PEN     Inject 15 Units Subcutaneous        * insulin aspart 100 UNITS/ML injection    NovoLOG     Inject 8 Units Subcutaneous        * insulin isophane & regular susp    HumuLIN MIX 70/30 PEN , NovoLIN MIX 70/30 PEN     Inject 30 Units Subcutaneous        * insulin NPH-insulin regular injection    HumuLIN MIX 70/30/NovoLIN MIX 70/30     Inject 18 Units Subcutaneous        LACTOBACILLUS ACID-PECTIN PO           lactulose 10 GM/15ML solution    CHRONULAC     Take 15 mLs by mouth        losartan 25 MG tablet    COZAAR     Take 25 mg by mouth        magnesium oxide 400 MG tablet    MAG-OX     Take 400 mg by mouth        mycophenolate 500 MG tablet    GENERIC EQUIVALENT    60 tablet    Take 1 tablet (500 mg) by mouth 2 times daily    ILD (interstitial lung disease) (H)       NOVOFINE 30G X 8 MM   Generic drug:  insulin pen needle           omeprazole 20 MG CR capsule    priLOSEC     Take 20 mg by mouth        oxyCODONE 5 MG capsule    OXY-IR     Take 5 mg by mouth        predniSONE 10 MG " tablet    DELTASONE    30 tablet    Take 1 tablet (10 mg) by mouth daily    ILD (interstitial lung disease) (H)       rifaximin 550 MG Tabs tablet    XIFAXAN     Take 550 mg by mouth        spironolactone 25 MG tablet    ALDACTONE     Take by mouth daily        sulfamethoxazole-trimethoprim 400-80 MG per tablet    BACTRIM    30 tablet    Take one tablet daily.    ILD (interstitial lung disease) (H)       VITAMIN A & D PO           Vitamin D3 400 UNITS Caps      Take 400 Units by mouth        * Notice:  This list has 6 medication(s) that are the same as other medications prescribed for you. Read the directions carefully, and ask your doctor or other care provider to review them with you.

## 2017-11-16 NOTE — PATIENT INSTRUCTIONS
Your coordinator will call you with test results from the labs,CT scan and echo.  Please call with any questions.

## 2017-11-20 ENCOUNTER — HOSPITAL ENCOUNTER (OUTPATIENT)
Dept: CARDIOLOGY | Facility: CLINIC | Age: 67
Discharge: HOME | End: 2017-11-20
Attending: INTERNAL MEDICINE | Admitting: INTERNAL MEDICINE
Payer: MEDICARE

## 2017-11-20 DIAGNOSIS — R06.00 DYSPNEA, UNSPECIFIED TYPE: ICD-10-CM

## 2017-11-20 PROCEDURE — 93306 TTE W/DOPPLER COMPLETE: CPT

## 2017-11-20 PROCEDURE — 93306 TTE W/DOPPLER COMPLETE: CPT | Mod: 26 | Performed by: INTERNAL MEDICINE

## 2017-11-21 LAB
DLCOUNC-%PRED-PRE: 40 %
DLCOUNC-PRE: 11.4 ML/MIN/MMHG
DLCOUNC-PRED: 27.81 ML/MIN/MMHG
ERV-%PRED-PRE: 14 %
ERV-PRE: 0.18 L
ERV-PRED: 1.26 L
EXPTIME-PRE: 6.88 SEC
FEF2575-%PRED-PRE: 67 %
FEF2575-PRE: 1.89 L/SEC
FEF2575-PRED: 2.78 L/SEC
FEFMAX-%PRED-PRE: 113 %
FEFMAX-PRE: 10.34 L/SEC
FEFMAX-PRED: 9.14 L/SEC
FEV1-%PRED-PRE: 57 %
FEV1-PRE: 2.04 L
FEV1FEV6-PRE: 80 %
FEV1FEV6-PRED: 78 %
FEV1FVC-PRE: 81 %
FEV1FVC-PRED: 76 %
FEV1SVC-PRE: 82 %
FEV1SVC-PRED: 69 %
FIFMAX-PRE: 3.92 L/SEC
FVC-%PRED-PRE: 54 %
FVC-PRE: 2.53 L
FVC-PRED: 4.67 L
IC-%PRED-PRE: 59 %
IC-PRE: 2.31 L
IC-PRED: 3.89 L
VA-%PRED-PRE: 58 %
VA-PRE: 4.17 L
VC-%PRED-PRE: 48 %
VC-PRE: 2.49 L
VC-PRED: 5.15 L

## 2017-12-04 ENCOUNTER — TELEPHONE (OUTPATIENT)
Dept: TRANSPLANT | Facility: CLINIC | Age: 67
End: 2017-12-04

## 2017-12-04 NOTE — TELEPHONE ENCOUNTER
Contacted Jim and also spoke with daughter Paula to follow up on recent clinic appointment.  Reviewed echo and CT results.  Confirmed that LFTs are slightly elevated and they should continue to monitor closely while taking Cellcept and Bactrim.  They requested that test results be faxed to PCP for review (not able to access Care Everywhere). Results faxed, included comment about LFTs.    Also confirmed that will fax referral for lung-liver transplant to [place].  Discussed that this information includes testing done at [facility], GI consult, demographic information, insurance information, and SS#.  Jim approved proceeding with referral.  Requested that they call with any questions or if they don't hear anything in next 2-3 weeks.  Paula will call if needed. They are aware that he is not a candidate for lung transplant alone due to his liver function, and that currently lung-liver transplant is not done at this institution.   Their current plan is to proceed with this referral if able, but otherwise to maintain care with Jim's local physicians.  A follow-up appointment with Dr. Greer is not scheduled.